# Patient Record
Sex: MALE | Race: OTHER | HISPANIC OR LATINO | Employment: UNEMPLOYED | URBAN - METROPOLITAN AREA
[De-identification: names, ages, dates, MRNs, and addresses within clinical notes are randomized per-mention and may not be internally consistent; named-entity substitution may affect disease eponyms.]

---

## 2017-01-09 ENCOUNTER — ALLSCRIPTS OFFICE VISIT (OUTPATIENT)
Dept: OTHER | Facility: OTHER | Age: 32
End: 2017-01-09

## 2017-11-12 ENCOUNTER — APPOINTMENT (EMERGENCY)
Dept: RADIOLOGY | Facility: HOSPITAL | Age: 32
End: 2017-11-12
Payer: COMMERCIAL

## 2017-11-12 ENCOUNTER — APPOINTMENT (EMERGENCY)
Dept: NON INVASIVE DIAGNOSTICS | Facility: HOSPITAL | Age: 32
End: 2017-11-12
Payer: COMMERCIAL

## 2017-11-12 ENCOUNTER — HOSPITAL ENCOUNTER (EMERGENCY)
Facility: HOSPITAL | Age: 32
Discharge: HOME | End: 2017-11-12
Admitting: EMERGENCY MEDICINE
Payer: COMMERCIAL

## 2017-11-12 VITALS
HEART RATE: 84 BPM | WEIGHT: 191.2 LBS | RESPIRATION RATE: 16 BRPM | SYSTOLIC BLOOD PRESSURE: 133 MMHG | OXYGEN SATURATION: 98 % | TEMPERATURE: 98.5 F | DIASTOLIC BLOOD PRESSURE: 81 MMHG

## 2017-11-12 DIAGNOSIS — M79.89 LEG SWELLING: Primary | ICD-10-CM

## 2017-11-12 LAB
ALBUMIN SERPL BCP-MCNC: 3.6 G/DL (ref 3.5–5)
ALP SERPL-CCNC: 83 U/L (ref 46–116)
ALT SERPL W P-5'-P-CCNC: 50 U/L (ref 12–78)
ANION GAP SERPL CALCULATED.3IONS-SCNC: 2 MMOL/L (ref 4–13)
AST SERPL W P-5'-P-CCNC: 31 U/L (ref 5–45)
BASOPHILS # BLD AUTO: 0.02 THOUSANDS/ΜL (ref 0–0.1)
BASOPHILS NFR BLD AUTO: 0 % (ref 0–1)
BILIRUB SERPL-MCNC: 0.36 MG/DL (ref 0.2–1)
BUN SERPL-MCNC: 13 MG/DL (ref 5–25)
CALCIUM SERPL-MCNC: 8.8 MG/DL (ref 8.3–10.1)
CHLORIDE SERPL-SCNC: 103 MMOL/L (ref 100–108)
CO2 SERPL-SCNC: 35 MMOL/L (ref 21–32)
CREAT SERPL-MCNC: 0.71 MG/DL (ref 0.6–1.3)
EOSINOPHIL # BLD AUTO: 0.18 THOUSAND/ΜL (ref 0–0.61)
EOSINOPHIL NFR BLD AUTO: 2 % (ref 0–6)
ERYTHROCYTE [DISTWIDTH] IN BLOOD BY AUTOMATED COUNT: 13.4 % (ref 11.6–15.1)
GFR SERPL CREATININE-BSD FRML MDRD: 123 ML/MIN/1.73SQ M
GLUCOSE SERPL-MCNC: 100 MG/DL (ref 65–140)
HCT VFR BLD AUTO: 38.8 % (ref 36.5–49.3)
HGB BLD-MCNC: 12.9 G/DL (ref 12–17)
LYMPHOCYTES # BLD AUTO: 2.6 THOUSANDS/ΜL (ref 0.6–4.47)
LYMPHOCYTES NFR BLD AUTO: 26 % (ref 14–44)
MCH RBC QN AUTO: 28.4 PG (ref 26.8–34.3)
MCHC RBC AUTO-ENTMCNC: 33.2 G/DL (ref 31.4–37.4)
MCV RBC AUTO: 86 FL (ref 82–98)
MONOCYTES # BLD AUTO: 0.69 THOUSAND/ΜL (ref 0.17–1.22)
MONOCYTES NFR BLD AUTO: 7 % (ref 4–12)
NEUTROPHILS # BLD AUTO: 6.39 THOUSANDS/ΜL (ref 1.85–7.62)
NEUTS SEG NFR BLD AUTO: 65 % (ref 43–75)
NRBC BLD AUTO-RTO: 0 /100 WBCS
NT-PROBNP SERPL-MCNC: 15 PG/ML
PLATELET # BLD AUTO: 172 THOUSANDS/UL (ref 149–390)
PMV BLD AUTO: 9 FL (ref 8.9–12.7)
POTASSIUM SERPL-SCNC: 4.2 MMOL/L (ref 3.5–5.3)
PROT SERPL-MCNC: 6.9 G/DL (ref 6.4–8.2)
RBC # BLD AUTO: 4.54 MILLION/UL (ref 3.88–5.62)
SODIUM SERPL-SCNC: 140 MMOL/L (ref 136–145)
WBC # BLD AUTO: 9.88 THOUSAND/UL (ref 4.31–10.16)

## 2017-11-12 PROCEDURE — 80053 COMPREHEN METABOLIC PANEL: CPT | Performed by: PHYSICIAN ASSISTANT

## 2017-11-12 PROCEDURE — 99284 EMERGENCY DEPT VISIT MOD MDM: CPT

## 2017-11-12 PROCEDURE — 71020 HB CHEST X-RAY 2VW FRONTAL&LATL: CPT

## 2017-11-12 PROCEDURE — 85025 COMPLETE CBC W/AUTO DIFF WBC: CPT | Performed by: PHYSICIAN ASSISTANT

## 2017-11-12 PROCEDURE — 71010 HB CHEST X-RAY 1 VIEW FRONTAL: CPT

## 2017-11-12 PROCEDURE — 36415 COLL VENOUS BLD VENIPUNCTURE: CPT | Performed by: PHYSICIAN ASSISTANT

## 2017-11-12 PROCEDURE — 93970 EXTREMITY STUDY: CPT

## 2017-11-12 PROCEDURE — 83880 ASSAY OF NATRIURETIC PEPTIDE: CPT | Performed by: PHYSICIAN ASSISTANT

## 2017-11-12 RX ORDER — DIAZEPAM 2 MG/1
2 TABLET ORAL
COMMUNITY
Start: 2017-11-02 | End: 2019-06-10 | Stop reason: ALTCHOICE

## 2017-11-12 RX ORDER — BACLOFEN 10 MG/1
TABLET ORAL
COMMUNITY
Start: 2016-12-05 | End: 2019-06-10 | Stop reason: ALTCHOICE

## 2017-11-12 RX ORDER — HYDROCODONE BITARTRATE AND ACETAMINOPHEN 10; 325 MG/1; MG/1
TABLET ORAL
COMMUNITY
Start: 2016-12-05 | End: 2019-06-10 | Stop reason: ALTCHOICE

## 2017-11-12 RX ORDER — QUETIAPINE FUMARATE 50 MG/1
TABLET, EXTENDED RELEASE ORAL
COMMUNITY
Start: 2016-12-02 | End: 2019-06-10 | Stop reason: ALTCHOICE

## 2017-11-12 RX ORDER — OXYCODONE HYDROCHLORIDE 5 MG/1
5 TABLET ORAL
COMMUNITY
Start: 2017-11-02 | End: 2017-11-12

## 2017-11-12 RX ORDER — NAPROXEN 500 MG/1
500 TABLET ORAL 2 TIMES DAILY WITH MEALS
Qty: 30 TABLET | Refills: 0 | Status: SHIPPED | OUTPATIENT
Start: 2017-11-12 | End: 2019-06-10 | Stop reason: ALTCHOICE

## 2017-11-12 RX ORDER — ZOLPIDEM TARTRATE 10 MG/1
TABLET ORAL
COMMUNITY
Start: 2016-12-05 | End: 2019-06-10 | Stop reason: ALTCHOICE

## 2017-11-12 NOTE — ED NOTES
Isabel Mcwilliams returned to bedside to speak w/patient for second time, per patient request        Royer Nunn RN  11/12/17 9374

## 2017-11-12 NOTE — DISCHARGE INSTRUCTIONS
Edema de la pierna   LO QUE NECESITA SABER:   El edema de la pierna es donaldo inflamación causada por la acumulación de líquido  Las piernas pueden hincharse si usted está sentado o de pie leslie largos períodos de [place], está [place], o está lesionado  La inflamación también se puede presentar si usted tiene insuficiencia cardíaca o problemas de la circulación  [place] significa que christy corazón no bombea todd a christy cuerpo lawrence debería  INSTRUCCIONES SOBRE [facility]:   Cuidados personales:   · Eleve raj piernas:  Levante raj piernas por encima del nivel de christy corazón tan seguido lawrence pueda  [place] va a disminuir inflamación y el dolor  Coloque raj piernas sobre almohadas o cobijas para mantenerlas elevadas cómodamente  · Use medias de compresión:  Estas medias apretadas generan presión en raj piernas para promover la circulación de todd y prevenir un coágulo de [place]  Use las medias leslie el día  No las use al dormir  · Aplique calor:  El calor ayuda a disminuir el dolor y la inflamación  Aplíquese calor en el área lesionada leslie 20 a 30 minutos cada 2 horas leslie la cantidad de [employer] indiquen  · Manténgase activo:  No esté de pie o sentado por mucho tiempo  Pregunte a christy médico acerca del mejor plan de ejercicio para usted  · Consuma alimentos saludables:  Los alimentos saludables incluyen frutas, verduras, pan integral, productos lácteos bajos en grasa, frijoles, ana magras y pescado  Pregunte si necesita seguir donaldo dieta especial  Limite la ryan  La sal hará que christy cuerpo retenga aún más líquidos  Acuda a raj consultas de control con christy médico según le indicaron  Anote raj preguntas para que se acuerde de hacerlas leslie raj visitas  Pregúntele a christy Len Hasmukh vitaminas y minerales son adecuados para usted     · Tiene fiebre o se siente más cansado de lo normal     · Las venas en raj piernas se beatrice más grandes de lo normal  Se podrían notar llenas o estar abultadas  · Tena piernas le pican o se sienten pesadas  · Tiene áreas o llagas pickens o edgar en tena piernas  Smith piel podría parecer con hoyuelos o podría tener hendiduras  · Está subiendo de [place]  · Tiene dificultad para  los tobillos  · La inflamación no desaparece, u otras partes de smith cuerpo se hinchan  · Usted tiene preguntas o inquietudes acerca de smith condición o cuidado  Regrese a la keely de emergencias si:   · No puede caminar  · Se siente desmayar o confundido  · Smith piel se ha puesto gerardo o elsi  · Smith pierna se siente cálida, sensible y [place]  Puede que estén inflamados y rojos  · Tiene dolor de pecho o dificultad para respirar que es peor que cuando se acostó  · De repente se siente mareado y tiene dificultad para respirar  · Le viene repentinamente un nuevo dolor en el pecho  Es probable que sienta más dolor cuando respira profundo o tose  Es posible que también expectore Jenaro copeland  © 2017 [facility] Information is for End User's use only and may not be sold, redistributed or otherwise used for commercial purposes  All illustrations and images included in CareNotes® are the copyrighted property of A D A M , Inc  or Emiliano Bishop  Esta información es sólo para uso en educación  Smith intención no es darle un consejo médico sobre enfermedades o tratamientos  Colsulte con smith [place] Kev farmacéutico antes de seguir cualquier régimen médico para saber si es seguro y efectivo para usted  DISCHARGE INSTRUCTIONS:    FOLLOW UP WITH YOUR PRIMARY CARE PROVIDER OR THE [address] [place] [address]  MAKE AN APPOINTMENT TO BE SEEN  TAKE NAPROXEN AS PRESCRIBED FOR PAIN AND INFLAMMATION  IF RASH, SHORTNESS OF BREATH OR TROUBLE SWALLOWING, STOP TAKING THE MEDICATION AND BE SEEN  REST AND ELEVATE THE LEGS  IF SYMPTOMS WORSEN OR NEW SYMPTOMS ARISE, RETURN TO THE ER TO BE SEEN

## 2017-11-12 NOTE — ED PROVIDER NOTES
History  Chief Complaint   Patient presents with    Leg Swelling     reports left leg swelling and pain x3 days  32y  o male with PMH of carpal tunnel and arthritis presents to the ER for bilateral leg swelling for 1 day  Patient states he has a history of arthritis in all of his joints and normally has pain but pain has been worsening and now he has swelling of his bilateral calf, ankle and feet  He denies taking any medication for his symptoms  He states his pain is located in both legs involving the entire leg  He states his symptoms are constant  He denies fever, chills, chest pain, dyspnea, N/V/D, abdominal pain, weakness or paresthesias  He denies long plane/car rides, hormone therapy, recent surgery, recent pregnancy, history of DVT or cancer  History provided by:  Patient   used: No        Prior to Admission Medications   Prescriptions Last Dose Informant Patient Reported? Taking? HYDROcodone-acetaminophen (NORCO)  mg per tablet   Yes Yes   Sig: Take by mouth   QUEtiapine (SEROquel XR) 50 mg   Yes Yes   Sig: Take by mouth   baclofen 10 mg tablet   Yes Yes   Sig: Take by mouth   diazepam (VALIUM) 2 mg tablet   Yes Yes   Sig: Take 2 mg by mouth   oxyCODONE (ROXICODONE) 5 mg immediate release tablet   Yes No   Sig: Take 5 mg by mouth   zolpidem (AMBIEN) 10 mg tablet   Yes Yes   Sig: Take by mouth      Facility-Administered Medications: None       Past Medical History:   Diagnosis Date    Carpal tunnel syndrome        Past Surgical History:   Procedure Laterality Date    CARPAL TUNNEL RELEASE         History reviewed  No pertinent family history  I have reviewed and agree with the history as documented  Social History   Substance Use Topics    Smoking status: Current Every Day Smoker    Smokeless tobacco: Never Used    Alcohol use No        Review of Systems   Constitutional: Negative for chills and fever  Eyes: Negative for visual disturbance     Respiratory: Negative for shortness of breath  Cardiovascular: Positive for leg swelling  Negative for chest pain  Gastrointestinal: Negative for abdominal pain, diarrhea, nausea and vomiting  Genitourinary: Negative for dysuria, frequency, hematuria and urgency  Musculoskeletal: Negative for back pain, neck pain and neck stiffness  Skin: Negative for rash  Neurological: Negative for weakness and numbness  Physical Exam  ED Triage Vitals [11/12/17 1124]   Temperature Pulse Respirations Blood Pressure SpO2   98 5 °F (36 9 °C) 104 18 136/72 95 %      Temp Source Heart Rate Source Patient Position - Orthostatic VS BP Location FiO2 (%)   Temporal -- Sitting Right arm --      Pain Score       8           Orthostatic Vital Signs  Vitals:    11/12/17 1124 11/12/17 1407 11/12/17 1505   BP: 136/72 132/83 133/81   Pulse: 104 86 84   Patient Position - Orthostatic VS: Sitting         Physical Exam   Constitutional:  Non-toxic appearance  No distress  HENT:   Head: Normocephalic and atraumatic  Right Ear: Tympanic membrane, external ear and ear canal normal  No drainage, swelling or tenderness  No foreign bodies  Tympanic membrane is not erythematous  No hemotympanum  Left Ear: Tympanic membrane, external ear and ear canal normal  No drainage, swelling or tenderness  No foreign bodies  Tympanic membrane is not erythematous  No hemotympanum  Nose: Nose normal    Mouth/Throat: Uvula is midline, oropharynx is clear and moist and mucous membranes are normal  No uvula swelling  No posterior oropharyngeal edema, posterior oropharyngeal erythema or tonsillar abscesses  No tonsillar exudate  Eyes: Conjunctivae and EOM are normal  Pupils are equal, round, and reactive to light  Neck: Normal range of motion and phonation normal  Neck supple  No tracheal deviation present  Cardiovascular: Normal rate, regular rhythm, S1 normal, S2 normal and normal heart sounds  Exam reveals no gallop and no friction rub      No murmur heard   Pulmonary/Chest: Effort normal and breath sounds normal  No respiratory distress  He has no decreased breath sounds  He has no wheezes  He has no rhonchi  He has no rales  He exhibits no tenderness  Abdominal: Soft  Bowel sounds are normal  He exhibits no distension  There is no tenderness  There is no rebound and no guarding  Musculoskeletal: Normal range of motion  He exhibits no edema or deformity  Right knee: He exhibits swelling  He exhibits normal range of motion, no ecchymosis, no deformity and no laceration  Tenderness found  Left knee: He exhibits swelling  He exhibits normal range of motion, no ecchymosis, no deformity, no laceration and no erythema  Tenderness found  Right ankle: He exhibits swelling  He exhibits normal range of motion, no ecchymosis, no deformity, no laceration and normal pulse  Tenderness  Lateral malleolus and medial malleolus tenderness found  Left ankle: He exhibits swelling  He exhibits normal range of motion, no ecchymosis, no deformity, no laceration and normal pulse  Tenderness  Lateral malleolus and medial malleolus tenderness found  Cervical back: Normal         Thoracic back: Normal         Lumbar back: Normal         Right lower leg: He exhibits tenderness and swelling  He exhibits no deformity  Left lower leg: He exhibits tenderness and swelling  He exhibits no deformity  Right foot: There is tenderness and swelling  There is normal range of motion, no crepitus, no deformity and no laceration  Left foot: There is tenderness and swelling  There is normal range of motion, no crepitus, no deformity and no laceration  Neurological: He is alert  Gait normal  GCS eye subscore is 4  GCS verbal subscore is 5  GCS motor subscore is 6  Skin: Skin is warm and dry  No rash noted  Psychiatric: He has a normal mood and affect  Nursing note and vitals reviewed        ED Medications  Medications - No data to display    Diagnostic Studies  Results Reviewed     Procedure Component Value Units Date/Time    BNP [[id number]]  (Normal) Collected:  11/12/17 1407    Lab Status:  Final result Specimen:  Blood from Arm, Left Updated:  11/12/17 1439     NT-proBNP 15 pg/mL     Comprehensive metabolic panel [[id number]]  (Abnormal) Collected:  11/12/17 1407    Lab Status:  Final result Specimen:  Blood from Arm, Left Updated:  11/12/17 1433     Sodium 140 mmol/L      Potassium 4 2 mmol/L      Chloride 103 mmol/L      CO2 35 (H) mmol/L      Anion Gap 2 (L) mmol/L      BUN 13 mg/dL      Creatinine 0 71 mg/dL      Glucose 100 mg/dL      Calcium 8 8 mg/dL      AST 31 U/L      ALT 50 U/L      Alkaline Phosphatase 83 U/L      Total Protein 6 9 g/dL      Albumin 3 6 g/dL      Total Bilirubin 0 36 mg/dL      eGFR 123 ml/min/1 73sq m     Narrative:         National Kidney Disease Education Program recommendations are as follows:  GFR calculation is accurate only with a steady state creatinine  Chronic Kidney disease less than 60 ml/min/1 73 sq  meters  Kidney failure less than 15 ml/min/1 73 sq  meters      CBC and differential [[id number]]  (Normal) Collected:  11/12/17 1407    Lab Status:  Final result Specimen:  Blood from Arm, Left Updated:  11/12/17 1419     WBC 9 88 Thousand/uL      RBC 4 54 Million/uL      Hemoglobin 12 9 g/dL      Hematocrit 38 8 %      MCV 86 fL      MCH 28 4 pg      MCHC 33 2 g/dL      RDW 13 4 %      MPV 9 0 fL      Platelets 429 Thousands/uL      nRBC 0 /100 WBCs      Neutrophils Relative 65 %      Lymphocytes Relative 26 %      Monocytes Relative 7 %      Eosinophils Relative 2 %      Basophils Relative 0 %      Neutrophils Absolute 6 39 Thousands/µL      Lymphocytes Absolute 2 60 Thousands/µL      Monocytes Absolute 0 69 Thousand/µL      Eosinophils Absolute 0 18 Thousand/µL      Basophils Absolute 0 02 Thousands/µL                  XR chest 2 views   ED Interpretation by Sydney Aragon PA-C (11/12 1452)   No acute findings seen at this time by me  Final Result by Roberto Carlos Multani MD (11/12 5897)      Nodular density overlying the right anterior 4th rib  Repeat PA chest x-ray to be performed with nipple markers  Workstation performed: [id number]         XR chest 1 view   Final Result by Roberto Carlos Multani MD (11/12 1456)      No active disease  Workstation performed: [id number]         VAS lower limb venous duplex study, complete bilateral    (Results Pending)              Procedures  Procedures       Phone Contacts  ED Phone Contact    ED Course  ED Course as of Nov 12 2043   Sun Nov 12, 2017   1448 Spoke with ultrasound technician, who informed me that the patient was negative for DVT bilaterally  MDM  Number of Diagnoses or Management Options  Leg swelling: new and requires workup  Diagnosis management comments: DDX consists of but not limited to: nephritis, DVT, arthritis, heart failure    Will check CBC, CMP, BNP, CXR and ultrasound of legs  1 Spoke with ultrasound technician, who informed me that the patient was negative for DVT bilaterally  At discharge, I instructed the patient to:  -follow up with pcp  -take Naproxen as prescribed for pain and inflammation  -rest and elevate your legs  -return to the ER if symptoms worsened or new symptoms arose  Patient agreed to this plan and was stable at time of discharge           Amount and/or Complexity of Data Reviewed  Clinical lab tests: ordered and reviewed  Tests in the radiology section of CPT®: ordered and reviewed  Independent visualization of images, tracings, or specimens: yes    Patient Progress  Patient progress: stable    CritCare Time    Disposition  Final diagnoses:   Leg swelling     Time reflects when diagnosis was documented in both MDM as applicable and the Disposition within this note     Time User Action Codes Description Comment    11/12/2017  2:53 PM Dayanara EDWARDS Add [M87 89] Leg swelling ED Disposition     ED Disposition Condition Comment    Discharge  [address] discharge to home/self care  Condition at discharge: Stable        Follow-up Information     Follow up With Specialties Details Why Contact Malou Bernard MD Family Medicine Schedule an appointment as soon as possible for a visit in 1 day  [address] [facility] [place]  [address]   [phone]  [phone]  [phone]          Discharge Medication List as of 11/12/2017  2:54 PM      START taking these medications    Details   naproxen (NAPROSYN) 500 mg tablet Take 1 tablet by mouth 2 (two) times a day with meals, Starting Sun 11/12/2017, Print         CONTINUE these medications which have NOT CHANGED    Details   baclofen 10 mg tablet Take by mouth, Starting Mon 12/5/2016, Historical Med      diazepam (VALIUM) 2 mg tablet Take 2 mg by mouth, Starting Thu 11/2/2017, Until Sun 11/12/2017, Historical Med      HYDROcodone-acetaminophen ([facility])  mg per tablet Take by mouth, Starting Mon 12/5/2016, Historical Med      QUEtiapine (SEROquel XR) 50 mg Take by mouth, Starting Fri 12/2/2016, Historical Med      zolpidem (AMBIEN) 10 mg tablet Take by mouth, Starting Mon 12/5/2016, Historical Med      oxyCODONE (ROXICODONE) 5 mg immediate release tablet Take 5 mg by mouth, Starting Thu 11/2/2017, Until Sun 11/12/2017, Historical Med           No discharge procedures on file      ED Provider  Electronically Signed by           Jayshree Mcgraw PA-C  11/12/17 6203

## 2017-11-12 NOTE — ED NOTES
Informed patient that vascular will arrive in aprox 45 minutes    Patient states that is ok and that he will wait for their arrival      Benson Crowell RN  11/12/17 5772

## 2018-01-14 VITALS
HEIGHT: 66 IN | RESPIRATION RATE: 20 BRPM | HEART RATE: 88 BPM | BODY MASS INDEX: 28.77 KG/M2 | TEMPERATURE: 98.5 F | WEIGHT: 179 LBS | DIASTOLIC BLOOD PRESSURE: 80 MMHG | SYSTOLIC BLOOD PRESSURE: 120 MMHG | OXYGEN SATURATION: 96 %

## 2019-02-09 ENCOUNTER — HOSPITAL ENCOUNTER (EMERGENCY)
Facility: HOSPITAL | Age: 34
Discharge: HOME | End: 2019-02-09
Admitting: EMERGENCY MEDICINE
Payer: COMMERCIAL

## 2019-02-09 ENCOUNTER — APPOINTMENT (EMERGENCY)
Dept: RADIOLOGY | Facility: HOSPITAL | Age: 34
End: 2019-02-09
Payer: COMMERCIAL

## 2019-02-09 ENCOUNTER — APPOINTMENT (EMERGENCY)
Dept: NON INVASIVE DIAGNOSTICS | Facility: HOSPITAL | Age: 34
End: 2019-02-09
Payer: COMMERCIAL

## 2019-02-09 VITALS
BODY MASS INDEX: 32.74 KG/M2 | DIASTOLIC BLOOD PRESSURE: 71 MMHG | RESPIRATION RATE: 20 BRPM | HEART RATE: 63 BPM | SYSTOLIC BLOOD PRESSURE: 115 MMHG | TEMPERATURE: 97.2 F | OXYGEN SATURATION: 99 % | WEIGHT: 202.82 LBS

## 2019-02-09 DIAGNOSIS — R60.0 LOWER EXTREMITY EDEMA: ICD-10-CM

## 2019-02-09 DIAGNOSIS — M54.50 ACUTE EXACERBATION OF CHRONIC LOW BACK PAIN: Primary | ICD-10-CM

## 2019-02-09 DIAGNOSIS — G89.29 ACUTE EXACERBATION OF CHRONIC LOW BACK PAIN: Primary | ICD-10-CM

## 2019-02-09 LAB
ALBUMIN SERPL BCP-MCNC: 3.4 G/DL (ref 3.5–5)
ALP SERPL-CCNC: 90 U/L (ref 46–116)
ALT SERPL W P-5'-P-CCNC: 52 U/L (ref 12–78)
ANION GAP SERPL CALCULATED.3IONS-SCNC: 6 MMOL/L (ref 4–13)
AST SERPL W P-5'-P-CCNC: 27 U/L (ref 5–45)
BASOPHILS # BLD AUTO: 0.04 THOUSANDS/ΜL (ref 0–0.1)
BASOPHILS NFR BLD AUTO: 1 % (ref 0–1)
BILIRUB SERPL-MCNC: 0.19 MG/DL (ref 0.2–1)
BUN SERPL-MCNC: 13 MG/DL (ref 5–25)
CALCIUM SERPL-MCNC: 8.5 MG/DL (ref 8.3–10.1)
CHLORIDE SERPL-SCNC: 105 MMOL/L (ref 100–108)
CO2 SERPL-SCNC: 29 MMOL/L (ref 21–32)
CREAT SERPL-MCNC: 0.82 MG/DL (ref 0.6–1.3)
DEPRECATED D DIMER PPP: 598 NG/ML (FEU)
EOSINOPHIL # BLD AUTO: 0.25 THOUSAND/ΜL (ref 0–0.61)
EOSINOPHIL NFR BLD AUTO: 3 % (ref 0–6)
ERYTHROCYTE [DISTWIDTH] IN BLOOD BY AUTOMATED COUNT: 13.2 % (ref 11.6–15.1)
GFR SERPL CREATININE-BSD FRML MDRD: 115 ML/MIN/1.73SQ M
GLUCOSE SERPL-MCNC: 131 MG/DL (ref 65–140)
HCT VFR BLD AUTO: 39.9 % (ref 36.5–49.3)
HGB BLD-MCNC: 13 G/DL (ref 12–17)
IMM GRANULOCYTES # BLD AUTO: 0.02 THOUSAND/UL (ref 0–0.2)
IMM GRANULOCYTES NFR BLD AUTO: 0 % (ref 0–2)
LYMPHOCYTES # BLD AUTO: 3.21 THOUSANDS/ΜL (ref 0.6–4.47)
LYMPHOCYTES NFR BLD AUTO: 41 % (ref 14–44)
MCH RBC QN AUTO: 28.8 PG (ref 26.8–34.3)
MCHC RBC AUTO-ENTMCNC: 32.6 G/DL (ref 31.4–37.4)
MCV RBC AUTO: 88 FL (ref 82–98)
MONOCYTES # BLD AUTO: 0.57 THOUSAND/ΜL (ref 0.17–1.22)
MONOCYTES NFR BLD AUTO: 7 % (ref 4–12)
NEUTROPHILS # BLD AUTO: 3.71 THOUSANDS/ΜL (ref 1.85–7.62)
NEUTS SEG NFR BLD AUTO: 48 % (ref 43–75)
NRBC BLD AUTO-RTO: 0 /100 WBCS
NT-PROBNP SERPL-MCNC: 9 PG/ML
PLATELET # BLD AUTO: 192 THOUSANDS/UL (ref 149–390)
PMV BLD AUTO: 8.7 FL (ref 8.9–12.7)
POTASSIUM SERPL-SCNC: 3.5 MMOL/L (ref 3.5–5.3)
PROT SERPL-MCNC: 6.8 G/DL (ref 6.4–8.2)
RBC # BLD AUTO: 4.52 MILLION/UL (ref 3.88–5.62)
SODIUM SERPL-SCNC: 140 MMOL/L (ref 136–145)
WBC # BLD AUTO: 7.8 THOUSAND/UL (ref 4.31–10.16)

## 2019-02-09 PROCEDURE — 72100 X-RAY EXAM L-S SPINE 2/3 VWS: CPT

## 2019-02-09 PROCEDURE — 83880 ASSAY OF NATRIURETIC PEPTIDE: CPT | Performed by: PHYSICIAN ASSISTANT

## 2019-02-09 PROCEDURE — 36415 COLL VENOUS BLD VENIPUNCTURE: CPT | Performed by: PHYSICIAN ASSISTANT

## 2019-02-09 PROCEDURE — 85025 COMPLETE CBC W/AUTO DIFF WBC: CPT | Performed by: PHYSICIAN ASSISTANT

## 2019-02-09 PROCEDURE — 80053 COMPREHEN METABOLIC PANEL: CPT | Performed by: PHYSICIAN ASSISTANT

## 2019-02-09 PROCEDURE — 85379 FIBRIN DEGRADATION QUANT: CPT | Performed by: PHYSICIAN ASSISTANT

## 2019-02-09 PROCEDURE — 99284 EMERGENCY DEPT VISIT MOD MDM: CPT

## 2019-02-09 PROCEDURE — 93970 EXTREMITY STUDY: CPT

## 2019-02-09 RX ORDER — ACETAMINOPHEN 325 MG/1
975 TABLET ORAL ONCE
Status: COMPLETED | OUTPATIENT
Start: 2019-02-09 | End: 2019-02-09

## 2019-02-09 RX ORDER — LIDOCAINE 50 MG/G
1 PATCH TOPICAL DAILY
Qty: 30 PATCH | Refills: 0 | Status: SHIPPED | OUTPATIENT
Start: 2019-02-09 | End: 2019-06-10 | Stop reason: ALTCHOICE

## 2019-02-09 RX ORDER — IBUPROFEN 400 MG/1
800 TABLET ORAL ONCE
Status: COMPLETED | OUTPATIENT
Start: 2019-02-09 | End: 2019-02-09

## 2019-02-09 RX ORDER — ACETAMINOPHEN 500 MG
500 TABLET ORAL EVERY 6 HOURS PRN
Qty: 30 TABLET | Refills: 0 | Status: SHIPPED | OUTPATIENT
Start: 2019-02-09

## 2019-02-09 RX ORDER — METHOCARBAMOL 750 MG/1
750 TABLET, FILM COATED ORAL EVERY 6 HOURS PRN
Qty: 30 TABLET | Refills: 0 | Status: SHIPPED | OUTPATIENT
Start: 2019-02-09 | End: 2019-06-10 | Stop reason: ALTCHOICE

## 2019-02-09 RX ORDER — IBUPROFEN 400 MG/1
400 TABLET ORAL EVERY 6 HOURS PRN
Qty: 30 TABLET | Refills: 0 | Status: SHIPPED | OUTPATIENT
Start: 2019-02-09

## 2019-02-09 RX ADMIN — IBUPROFEN 800 MG: 400 TABLET ORAL at 20:19

## 2019-02-09 RX ADMIN — ACETAMINOPHEN 975 MG: 325 TABLET, FILM COATED ORAL at 20:19

## 2019-02-10 PROCEDURE — 93970 EXTREMITY STUDY: CPT | Performed by: SURGERY
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
## 2019-02-10 NOTE — ED NOTES
Patient returned from  Martha Sanabria RN  02/09/19 1950
Patient transported to [address], RN  02/09/19 8708
Patient transported to [email or web address]  02/09/19 1043
Provider at bedside       Lonnie Gannon RN  02/09/19 [facility]
U/S at bedside        Juan Daniel Coronado RN  02/09/19 2017
Vascular paged        Cat Barker, FAWAD  02/09/19 8353
synthroid

## 2019-02-10 NOTE — DISCHARGE INSTRUCTIONS
Dolor michael de espalda inferior   CUIDADO AMBULATORIO:   El dolor michael de la región lumbar de la espalda  es donaldo molestia repentina en la parte inferior de christy espalda que dura hasta por 6 semanas  La molestia hace que sea dificil que usted tolere la [place]  Los síntomas más comunes incluyen los siguientes:   · Rigidez o espasmos    · Dolor hacia abajo o en un lado de christy pierna    · Mantenerse en donaldo posición o postura inusual para disminuir el dolor en christy espalda    · No poder encontrar donaldo posición cómoda mientras está sentado    · Poco a poco aumento del dolor por 24 o 50 horas después de ejercer tensión en christy espalda    · [employer] en el lumbago o dolor intenso cuando mueve christy espalda  Busque atención médica inmediata para los siguientes síntomas:   · Dolor intenso    · Repentinamente rigidez y pesadez en ambos glúteos hacia abajo de ambas piernas    · Entumecimiento o debilidad en donaldo pierna o dolor en ambas piernas    · Entumecimiento en el área genital o a través de la parte baja de christy espalda    · Incapaz de controlar la orina o raj evacuaciones intestinales  Pregúntele a christy médico qué vitaminas y minerales son adecuados para usted  · Usted tiene fiebre  · Usted tiene un dolor por la noche o cuando descansa  · Christy dolor no mejora con el tratamiento  · Usted tiene dolor que empeora cuando tose o estornuda  · Usted siente un estallido o chasquido repentino en christy espalda  · Usted tiene preguntas o inquietudes acerca de christy condición o cuidado  La meta del tratamiento para el dolor de la parte inferior de la espalda  es aliviar christy dolor y ayudarlo a tolerar la actividad  La mayoría de las personas que tienen dolor michael de la parte inferior de la espalda se mejoran entre unas 4 a 6 semanas  Es posible que usted necesite alguno de los siguientes:  · El acetaminofén  dionne el dolor  Está disponible sin receta médica  Pregunte la cantidad y la frecuencia con que debe tomarlos   [address] indicaciones  El acetaminofén puede causar daño en el hígado cuando no se drew de forma correcta  · AINEs (Analgésicos antiinflamatorios no esteroides)  ayudan a disminuir la inflamación y el dolor  Marsha medicamento esta disponible con o sin donaldo receta médica  Los AINEs pueden causar sangrado estomacal o problemas renales en ciertas personas  Si usted drew un medicamento anticoagulante, siempre pregúntele a christy médico si los BERTRAND son seguros para usted  Siempre elen la etiqueta de marsha medicamento y [place] instrucciones  · Un medicamento con receta para el dolor  podrían ser Florentino Fret  Pregunte al médico cómo debe jaylan marsha medicamento de forma riley  · Relajantes musculares  disminuyen el dolor y [employer] músculos de la parte inferior de la columna  El [place] de christy síntomas:   · Manténgase activo  lo más que pueda sin causar más dolor  El reposo en cama puede empeorar christy dolor de espalda  Comience con ejercicios ligeros lawrence caminar  Evite levantar objetos hasta que ya no tenga dolor  Solicite más información acerca de las actividades físicas o plan de ejercicios que son los adecuados para usted  · El hielo  ayuda a disminuir la inflamación, el dolor y los espasmos musculares  Ponga hielo cristina en donaldo bolsa plástica  Cúbrala con donaldo toalla  Aplíquela en christy luís lumbar por 20 a 30 minutos cada 2 horas  Ash esto por 2 a 3 días después que el dolor empiece, o según lo indicado  · El calor  ayuda a disminuir dolor y espasmos musculares  Empiece a utilizar calor después de buck terminado el tratamiento con el hielo  Utilice donaldo toalla pequeña empapada con [employer] [place], donaldo almohada térmica o tome un baño de carmela con agua tibia  Aplíquese calor en el área lesionada leslie 20 a 30 minutos cada 2 horas leslie la cantidad de [employer] indiquen  [place] entre el calor y el hielo  Prevenir el dolor michael de la parte inferior de la espalda:   · Use la mecánica corporal adecuada  ¨ Flexione la cadera y las rodillas cuando Mohsen Hones a levantar un objeto  No doble la cintura  Utilice los [employer] de las piernas mientras levanta smith carga  No use smith espalda  Mantenga el objeto cerca de smith pecho mientras lo levanta  No se tuerza, ni levante cualquier cosa por encima de smith cintura  ¨ Cambie smith posición frecuentemente cuando pase mucho tiempo de pie  Descanse un pie sobre donaldo Tracy Drilling o un reposapiés e intercambie con el otro pie frecuentemente  ¨ No permanezca sentado por lapsos de tiempo prolongados  Cuando sea necesario hacerlo, siéntese en donaldo silla de respaldo recto con los pies apoyados en el suelo  Nunca alcance, jale ni empuje mientras se encuentra sentando  · Ash ejercicios que fortalezcan raj músculos de la espalda  Entre en calor antes de hacer ejercicio  Consulte con smith médico sobre [employer] plan de ejercicios para usted  · Mantenga un peso saludable  Consulte con smith médico cuánto debería pesar  Pida que le ayude a crear un plan para bajar de peso si usted tiene sobrepeso  Acuda a raj consultas de control con smith médico según le indicaron  Regrese a donaldo paige de seguimiento si usted aun tiene [employer] de 1 a 3 semanas de [place]  Puede que usted necesite acudir con un ortopedista si smith dolor de espalda dura más de 12 semanas  Anote raj preguntas para que se acuerde de hacerlas leslie raj visitas  © 2017 [address] Information is for End User's use only and may not be sold, redistributed or otherwise used for commercial purposes  All illustrations and images included in CareNotes® are the copyrighted property of A D A M , Inc  or Emiliano Bishop  Esta información es sólo para uso en educación  Smith intención no es darle un consejo médico sobre enfermedades o tratamientos  Colsulte con smith Ladena Creed farmacéutico antes de seguir cualquier régimen médico para saber si es seguro y efectivo para usted

## 2019-02-11 ENCOUNTER — TELEPHONE (OUTPATIENT)
Dept: PHYSICAL THERAPY | Facility: OTHER | Age: 34
End: 2019-02-11

## 2019-02-11 NOTE — TELEPHONE ENCOUNTER
Called patient using [employer]  [id number]  Unsuccessful attempt at reaching patient  Patient did not have a voicemail box set up and  was unable to leave voicemail message at this time

## 2019-02-14 ENCOUNTER — TELEPHONE (OUTPATIENT)
Dept: PHYSICAL THERAPY | Facility: OTHER | Age: 34
End: 2019-02-14

## 2019-02-14 NOTE — TELEPHONE ENCOUNTER
Multiple unsuccessful attempts at reaching patient over multiple days  On this call attempt an automated recording stated, "the person you are trying to reach is unable to accept calls at this time " RN unable to contact patient, leave voicemail message or callback number  Referral to be closed at this time

## 2019-06-10 ENCOUNTER — HOSPITAL ENCOUNTER (EMERGENCY)
Facility: HOSPITAL | Age: 34
Discharge: HOME | End: 2019-06-10
Attending: EMERGENCY MEDICINE | Admitting: EMERGENCY MEDICINE
Payer: COMMERCIAL

## 2019-06-10 ENCOUNTER — APPOINTMENT (EMERGENCY)
Dept: RADIOLOGY | Facility: HOSPITAL | Age: 34
End: 2019-06-10
Payer: COMMERCIAL

## 2019-06-10 VITALS
WEIGHT: 200.4 LBS | BODY MASS INDEX: 32.35 KG/M2 | RESPIRATION RATE: 18 BRPM | SYSTOLIC BLOOD PRESSURE: 150 MMHG | TEMPERATURE: 98.4 F | OXYGEN SATURATION: 97 % | DIASTOLIC BLOOD PRESSURE: 88 MMHG | HEART RATE: 83 BPM

## 2019-06-10 DIAGNOSIS — M25.532 LEFT WRIST PAIN: Primary | ICD-10-CM

## 2019-06-10 PROCEDURE — 99283 EMERGENCY DEPT VISIT LOW MDM: CPT

## 2019-06-10 PROCEDURE — 73110 X-RAY EXAM OF WRIST: CPT

## 2019-06-10 PROCEDURE — 99283 EMERGENCY DEPT VISIT LOW MDM: CPT | Performed by: EMERGENCY MEDICINE

## 2019-06-10 RX ORDER — NAPROXEN 500 MG/1
500 TABLET ORAL 2 TIMES DAILY WITH MEALS
Qty: 10 TABLET | Refills: 0 | Status: SHIPPED | OUTPATIENT
Start: 2019-06-10 | End: 2019-12-18 | Stop reason: ALTCHOICE

## 2019-06-12 ENCOUNTER — OFFICE VISIT (OUTPATIENT)
Dept: OBGYN CLINIC | Facility: MEDICAL CENTER | Age: 34
End: 2019-06-12
Payer: COMMERCIAL

## 2019-06-12 VITALS — WEIGHT: 205 LBS | HEIGHT: 62 IN | BODY MASS INDEX: 37.73 KG/M2

## 2019-06-12 DIAGNOSIS — S63.502A SPRAIN OF LEFT WRIST, INITIAL ENCOUNTER: Primary | ICD-10-CM

## 2019-06-12 PROCEDURE — 99203 OFFICE O/P NEW LOW 30 MIN: CPT | Performed by: ORTHOPAEDIC SURGERY

## 2019-12-18 ENCOUNTER — APPOINTMENT (EMERGENCY)
Dept: RADIOLOGY | Facility: HOSPITAL | Age: 34
End: 2019-12-18
Payer: COMMERCIAL

## 2019-12-18 ENCOUNTER — HOSPITAL ENCOUNTER (EMERGENCY)
Facility: HOSPITAL | Age: 34
Discharge: HOME | End: 2019-12-18
Attending: EMERGENCY MEDICINE
Payer: COMMERCIAL

## 2019-12-18 VITALS
HEART RATE: 84 BPM | TEMPERATURE: 97.7 F | WEIGHT: 212.08 LBS | RESPIRATION RATE: 18 BRPM | BODY MASS INDEX: 38.79 KG/M2 | DIASTOLIC BLOOD PRESSURE: 72 MMHG | SYSTOLIC BLOOD PRESSURE: 137 MMHG | OXYGEN SATURATION: 100 %

## 2019-12-18 DIAGNOSIS — R60.0 BILATERAL LOWER EXTREMITY EDEMA: ICD-10-CM

## 2019-12-18 DIAGNOSIS — M54.50 CHRONIC LOW BACK PAIN: Primary | ICD-10-CM

## 2019-12-18 DIAGNOSIS — G89.29 CHRONIC LOW BACK PAIN: Primary | ICD-10-CM

## 2019-12-18 LAB
ALBUMIN SERPL BCP-MCNC: 3.5 G/DL (ref 3.5–5)
ALP SERPL-CCNC: 92 U/L (ref 46–116)
ALT SERPL W P-5'-P-CCNC: 77 U/L (ref 12–78)
ANION GAP SERPL CALCULATED.3IONS-SCNC: 8 MMOL/L (ref 4–13)
AST SERPL W P-5'-P-CCNC: 38 U/L (ref 5–45)
BASOPHILS # BLD AUTO: 0.04 THOUSANDS/ΜL (ref 0–0.1)
BASOPHILS NFR BLD AUTO: 0 % (ref 0–1)
BILIRUB SERPL-MCNC: 0.23 MG/DL (ref 0.2–1)
BUN SERPL-MCNC: 14 MG/DL (ref 5–25)
CALCIUM SERPL-MCNC: 8.4 MG/DL (ref 8.3–10.1)
CHLORIDE SERPL-SCNC: 102 MMOL/L (ref 100–108)
CO2 SERPL-SCNC: 29 MMOL/L (ref 21–32)
CREAT SERPL-MCNC: 0.89 MG/DL (ref 0.6–1.3)
EOSINOPHIL # BLD AUTO: 0.24 THOUSAND/ΜL (ref 0–0.61)
EOSINOPHIL NFR BLD AUTO: 3 % (ref 0–6)
ERYTHROCYTE [DISTWIDTH] IN BLOOD BY AUTOMATED COUNT: 13.2 % (ref 11.6–15.1)
GFR SERPL CREATININE-BSD FRML MDRD: 111 ML/MIN/1.73SQ M
GLUCOSE SERPL-MCNC: 126 MG/DL (ref 65–140)
HCT VFR BLD AUTO: 40.1 % (ref 36.5–49.3)
HGB BLD-MCNC: 12.8 G/DL (ref 12–17)
IMM GRANULOCYTES # BLD AUTO: 0.02 THOUSAND/UL (ref 0–0.2)
IMM GRANULOCYTES NFR BLD AUTO: 0 % (ref 0–2)
LYMPHOCYTES # BLD AUTO: 3.52 THOUSANDS/ΜL (ref 0.6–4.47)
LYMPHOCYTES NFR BLD AUTO: 37 % (ref 14–44)
MCH RBC QN AUTO: 27.9 PG (ref 26.8–34.3)
MCHC RBC AUTO-ENTMCNC: 31.9 G/DL (ref 31.4–37.4)
MCV RBC AUTO: 87 FL (ref 82–98)
MONOCYTES # BLD AUTO: 0.69 THOUSAND/ΜL (ref 0.17–1.22)
MONOCYTES NFR BLD AUTO: 7 % (ref 4–12)
NEUTROPHILS # BLD AUTO: 4.95 THOUSANDS/ΜL (ref 1.85–7.62)
NEUTS SEG NFR BLD AUTO: 53 % (ref 43–75)
NRBC BLD AUTO-RTO: 0 /100 WBCS
PLATELET # BLD AUTO: 219 THOUSANDS/UL (ref 149–390)
PMV BLD AUTO: 9 FL (ref 8.9–12.7)
POTASSIUM SERPL-SCNC: 3.7 MMOL/L (ref 3.5–5.3)
PROT SERPL-MCNC: 7.2 G/DL (ref 6.4–8.2)
RBC # BLD AUTO: 4.59 MILLION/UL (ref 3.88–5.62)
SODIUM SERPL-SCNC: 139 MMOL/L (ref 136–145)
TSH SERPL DL<=0.05 MIU/L-ACNC: 2.42 UIU/ML (ref 0.36–3.74)
WBC # BLD AUTO: 9.46 THOUSAND/UL (ref 4.31–10.16)

## 2019-12-18 PROCEDURE — 80053 COMPREHEN METABOLIC PANEL: CPT | Performed by: EMERGENCY MEDICINE

## 2019-12-18 PROCEDURE — 84443 ASSAY THYROID STIM HORMONE: CPT | Performed by: EMERGENCY MEDICINE

## 2019-12-18 PROCEDURE — 72100 X-RAY EXAM L-S SPINE 2/3 VWS: CPT

## 2019-12-18 PROCEDURE — 99284 EMERGENCY DEPT VISIT MOD MDM: CPT | Performed by: EMERGENCY MEDICINE

## 2019-12-18 PROCEDURE — 36415 COLL VENOUS BLD VENIPUNCTURE: CPT | Performed by: EMERGENCY MEDICINE

## 2019-12-18 PROCEDURE — 99284 EMERGENCY DEPT VISIT MOD MDM: CPT

## 2019-12-18 PROCEDURE — 90471 IMMUNIZATION ADMIN: CPT

## 2019-12-18 PROCEDURE — 90715 TDAP VACCINE 7 YRS/> IM: CPT | Performed by: EMERGENCY MEDICINE

## 2019-12-18 PROCEDURE — 96374 THER/PROPH/DIAG INJ IV PUSH: CPT

## 2019-12-18 PROCEDURE — 85025 COMPLETE CBC W/AUTO DIFF WBC: CPT | Performed by: EMERGENCY MEDICINE

## 2019-12-18 PROCEDURE — 73590 X-RAY EXAM OF LOWER LEG: CPT

## 2019-12-18 RX ORDER — KETOROLAC TROMETHAMINE 30 MG/ML
15 INJECTION, SOLUTION INTRAMUSCULAR; INTRAVENOUS ONCE
Status: COMPLETED | OUTPATIENT
Start: 2019-12-18 | End: 2019-12-18

## 2019-12-18 RX ORDER — LIDOCAINE 50 MG/G
1 PATCH TOPICAL ONCE
Status: DISCONTINUED | OUTPATIENT
Start: 2019-12-18 | End: 2019-12-18 | Stop reason: HOSPADM

## 2019-12-18 RX ORDER — ACETAMINOPHEN 325 MG/1
975 TABLET ORAL ONCE
Status: COMPLETED | OUTPATIENT
Start: 2019-12-18 | End: 2019-12-18

## 2019-12-18 RX ADMIN — KETOROLAC TROMETHAMINE 15 MG: 30 INJECTION, SOLUTION INTRAMUSCULAR; INTRAVENOUS at 20:30

## 2019-12-18 RX ADMIN — TETANUS TOXOID, REDUCED DIPHTHERIA TOXOID AND ACELLULAR PERTUSSIS VACCINE, ADSORBED 0.5 ML: 5; 2.5; 8; 8; 2.5 SUSPENSION INTRAMUSCULAR at 20:32

## 2019-12-18 RX ADMIN — LIDOCAINE 1 PATCH: 50 PATCH TOPICAL at 20:28

## 2019-12-18 RX ADMIN — ACETAMINOPHEN 975 MG: 325 TABLET ORAL at 20:27

## 2019-12-19 ENCOUNTER — TELEPHONE (OUTPATIENT)
Dept: PHYSICAL THERAPY | Facility: OTHER | Age: 34
End: 2019-12-19

## 2019-12-19 NOTE — ED PROVIDER NOTES
History  Chief Complaint   Patient presents with    Leg Pain     Reports wound to L leg x months, denies drainage, redness, states B/L leg swelling ongoing     Back Pain     Chronic back pain, to B/L legs, dneies new injury  50-year-old male presenting for evaluation of chronic low back pain and bilateral lower extremity swelling  Symptoms have been present for at least the past year  States that he has midline low back pain with radiation to both sides and down both legs to his feet  He has been able ambulate without difficulty  No weakness  States that he does have some pins and needle sensation in bilateral lower extremities but no actual numbness  No trauma to his back  No bladder or bowel retention/incontinence, saddle anesthesia  Patient also reports bilateral lower extremity swelling that is slightly worsening than baseline  Patient was seen in the ED in February regarding the symptoms and had workup and was instructed to establish care with PCP in comprehensive spine program which she has not  He is not taking any current medications for the symptoms  He states that he is in a drug rehab program for pills and heroin, but denies ever using any IV drugs  Denies any fevers, chills, CP, SOB, abdominal pain, nausea vomiting, changes in stool, urinary complaints  A/P:  50-year-old male with chronic low back pain and lower extremity swelling, given patient is stating that he is having worsening edema will work this up by checking kidney function, liver function, electrolytes, refer to PCP and comprehensive spine program           Per review of records, patient seen in the ED in February 2019 for chronic low back pain and bilateral lower extremity swelling    Prior to Admission Medications   Prescriptions Last Dose Informant Patient Reported?  Taking?   acetaminophen (TYLENOL) 500 mg tablet Not Taking at Unknown time  No No   Sig: Take 1 tablet (500 mg total) by mouth every 6 (six) hours as needed for mild pain, moderate pain, headaches or fever   Patient not taking: Reported on 12/18/2019   ibuprofen (MOTRIN) 400 mg tablet Not Taking at Unknown time  No No   Sig: Take 1 tablet (400 mg total) by mouth every 6 (six) hours as needed for mild pain, moderate pain or fever   Patient not taking: Reported on 12/18/2019      Facility-Administered Medications: None       Past Medical History:   Diagnosis Date    Arthritis     Carpal tunnel syndrome     Chronic back pain        Past Surgical History:   Procedure Laterality Date    CARPAL TUNNEL RELEASE         History reviewed  No pertinent family history  I have reviewed and agree with the history as documented  Social History     Tobacco Use    Smoking status: Current Every Day Smoker     Packs/day: 0 50     Types: Cigarettes    Smokeless tobacco: Never Used   Substance Use Topics    Alcohol use: No    Drug use: No        Review of Systems   Constitutional: Negative for chills, fever and unexpected weight change  HENT: Negative for ear pain, rhinorrhea and sore throat  Eyes: Negative for pain and visual disturbance  Respiratory: Negative for cough and shortness of breath  Cardiovascular: Negative for chest pain and leg swelling  Gastrointestinal: Negative for abdominal pain, constipation, diarrhea, nausea and vomiting  Endocrine: Negative for polydipsia, polyphagia and polyuria  Genitourinary: Negative for dysuria, frequency, hematuria and urgency  Musculoskeletal: Positive for back pain  Negative for myalgias and neck pain  Skin: Negative for color change and rash  Allergic/Immunologic: Negative for environmental allergies and immunocompromised state  Neurological: Negative for dizziness, weakness, light-headedness, numbness and headaches  Hematological: Negative for adenopathy  Does not bruise/bleed easily  Psychiatric/Behavioral: Negative for agitation and confusion     All other systems reviewed and are negative  Physical Exam  Physical Exam   Constitutional: He is oriented to person, place, and time  He appears well-developed and well-nourished  HENT:   Head: Normocephalic and atraumatic  Nose: Nose normal    Mouth/Throat: Oropharynx is clear and moist    Eyes: Conjunctivae and EOM are normal    Neck: Normal range of motion  Neck supple  Cardiovascular: Normal rate, regular rhythm, normal heart sounds and intact distal pulses  Pulmonary/Chest: Effort normal and breath sounds normal  No stridor  No respiratory distress  He has no wheezes  He has no rales  He exhibits no tenderness  Abdominal: Soft  He exhibits no distension  There is no tenderness  There is no rebound and no guarding  Back:  No midline C/T spine tenderness, mild tenderness to palpation over midline of lumbar spine, no step-offs or deformities, no external signs of trauma, no overlying skin changes, no swelling, negative straight leg raise test bilaterally, strength 5/5 throughout, sensation is grossly intact, normal reflexes, steady/normal gait   Musculoskeletal: Normal range of motion  He exhibits no deformity  Mild, 1+ edema bilateral lower extremities, no calf swelling/tenderness, scab to left shin without any surrounding skin changes/drainage/evidence of infection   Neurological: He is alert and oriented to person, place, and time  He exhibits normal muscle tone  Coordination normal    Skin: Skin is warm and dry  No rash noted  Psychiatric: He has a normal mood and affect  Judgment and thought content normal    Nursing note and vitals reviewed        Vital Signs  ED Triage Vitals [12/18/19 1923]   Temperature Pulse Respirations Blood Pressure SpO2   97 7 °F (36 5 °C) 98 18 128/76 99 %      Temp Source Heart Rate Source Patient Position - Orthostatic VS BP Location FiO2 (%)   Oral Monitor Sitting Left arm --      Pain Score       Worst Possible Pain           Vitals:    12/18/19 1923 12/18/19 2151   BP: 128/76 137/72   Pulse: 98 84   Patient Position - Orthostatic VS: Sitting Lying         Visual Acuity  Visual Acuity      Most Recent Value   L Pupil Size (mm)  4   R Pupil Size (mm)  4          ED Medications  Medications   ketorolac (TORADOL) injection 15 mg (15 mg Intravenous Given 12/18/19 2030)   acetaminophen (TYLENOL) tablet 975 mg (975 mg Oral Given 12/18/19 2027)   tetanus-diphtheria-acellular pertussis (BOOSTRIX) IM injection 0 5 mL (0 5 mL Intramuscular Given 12/18/19 2032)       Diagnostic Studies  Results Reviewed     Procedure Component Value Units Date/Time    TSH, 3rd generation with Free T4 reflex [[id number]]  (Normal) Collected:  12/18/19 2026    Lab Status:  Final result Specimen:  Blood from Arm, Right Updated:  12/18/19 2102     TSH 3RD GENERATON 2 417 uIU/mL     Narrative:       Patients undergoing fluorescein dye angiography may retain small amounts of fluorescein in the body for 48-72 hours post procedure  Samples containing fluorescein can produce falsely depressed TSH values  If the patient had this procedure,a specimen should be resubmitted post fluorescein clearance        Comprehensive metabolic panel [[id number]] Collected:  12/18/19 2026    Lab Status:  Final result Specimen:  Blood from Arm, Right Updated:  12/18/19 2052     Sodium 139 mmol/L      Potassium 3 7 mmol/L      Chloride 102 mmol/L      CO2 29 mmol/L      ANION GAP 8 mmol/L      BUN 14 mg/dL      Creatinine 0 89 mg/dL      Glucose 126 mg/dL      Calcium 8 4 mg/dL      AST 38 U/L      ALT 77 U/L      Alkaline Phosphatase 92 U/L      Total Protein 7 2 g/dL      Albumin 3 5 g/dL      Total Bilirubin 0 23 mg/dL      eGFR 111 ml/min/1 73sq m     Narrative:       [place] guidelines for Chronic Kidney Disease (CKD):     Stage 1 with normal or high GFR (GFR > 90 mL/min/1 73 square meters)    Stage 2 Mild CKD (GFR = 60-89 mL/min/1 73 square meters)    Stage 3A Moderate CKD (GFR = 45-59 mL/min/1 73 square meters)    Stage 3B Moderate CKD (GFR = 30-44 mL/min/1 73 square meters)    Stage 4 Severe CKD (GFR = 15-29 mL/min/1 73 square meters)    Stage 5 End Stage CKD (GFR <15 mL/min/1 73 square meters)  Note: GFR calculation is accurate only with a steady state creatinine    CBC and differential [[id number]] Collected:  12/18/19 2026    Lab Status:  Final result Specimen:  Blood from Arm, Right Updated:  12/18/19 2040     WBC 9 46 Thousand/uL      RBC 4 59 Million/uL      Hemoglobin 12 8 g/dL      Hematocrit 40 1 %      MCV 87 fL      MCH 27 9 pg      MCHC 31 9 g/dL      RDW 13 2 %      MPV 9 0 fL      Platelets 615 Thousands/uL      nRBC 0 /100 WBCs      Neutrophils Relative 53 %      Immat GRANS % 0 %      Lymphocytes Relative 37 %      Monocytes Relative 7 %      Eosinophils Relative 3 %      Basophils Relative 0 %      Neutrophils Absolute 4 95 Thousands/µL      Immature Grans Absolute 0 02 Thousand/uL      Lymphocytes Absolute 3 52 Thousands/µL      Monocytes Absolute 0 69 Thousand/µL      Eosinophils Absolute 0 24 Thousand/µL      Basophils Absolute 0 04 Thousands/µL                  XR lumbar spine 2 or 3 views   Final Result by Hilda Gibson MD (12/19 0827)      Possible pars defects at the lumbar sacral junction versus artifact      Oblique views and/or MRI may be helpful if symptoms persist given chronicity  The study was marked in EPIC for significant notification  Workstation performed: [id number]         XR tibia fibula 2 views LEFT   Final Result by Crow Chadwick MD (12/19 0831)      No acute osseous abnormality              Workstation performed: [id number]                    Procedures  Procedures         ED Course  ED Course as of Dec 24 2229   Wed Dec 18, 2019   2113 Called reading room for read                                  MDM  Number of Diagnoses or Management Options  Bilateral lower extremity edema:   Chronic low back pain:   Diagnosis management comments: 26 yo M with chronic low back pain- no red flags, reassuring exam, discussed symptom management, comprehensive spine program  - chronic b/l lower extremity edema, labs grossly unremarkable, recommend PCP f/u    Return precautions discussed with pt who expressed understanding with plan       Amount and/or Complexity of Data Reviewed  Clinical lab tests: ordered and reviewed  Tests in the radiology section of CPT®: ordered and reviewed  Tests in the medicine section of CPT®: ordered and reviewed          Disposition  Final diagnoses:   Chronic low back pain   Bilateral lower extremity edema - chronic     Time reflects when diagnosis was documented in both MDM as applicable and the Disposition within this note     Time User Action Codes Description Comment    12/18/2019  9:43 PM Harley Reilly Add [M54 5,  G89 29] Chronic low back pain     12/18/2019  9:43 PM Flower EDWARDS Add [R60 0] Bilateral lower extremity edema     12/18/2019  9:44 PM Flower EDWARDS Modify [R60 0] Bilateral lower extremity edema chronic      ED Disposition     ED Disposition Condition Date/Time Comment    Discharge Stable Wed Dec 18, 2019  9:43 PM Rigoberto Jaffe discharge to home/self care              Follow-up Information     Follow up With Specialties Details Why Contact Info Additional Information    Regina Harrell MD [facility]   [address] [facility], [address] [place] [id number]  [address] [facility]   [address], [address] [address]  [address], [address], [address], [place], [place] [address] Physical Therapy   [phone]          Discharge Medication List as of 12/18/2019  9:46 PM      CONTINUE these medications which have NOT CHANGED    Details   acetaminophen (TYLENOL) 500 mg tablet Take 1 tablet (500 mg total) by mouth every 6 (six) hours as needed for mild pain, moderate pain, headaches or fever, Starting Sat 2/9/2019, Print      ibuprofen (MOTRIN) 400 mg tablet Take 1 tablet (400 mg total) by mouth every 6 (six) hours as needed for mild pain, moderate pain or fever, Starting Sat 2/9/2019, Print               ED Provider  Electronically Signed by           Amaya Ken DO  12/24/19 7721

## 2019-12-19 NOTE — RESULT ENCOUNTER NOTE
Patient called back  Reviewed results and recommended additional imaging  Patient notes understanding

## 2019-12-19 NOTE — TELEPHONE ENCOUNTER
was used # [id number]  Pt's ph was busy, unable to leave a message, will need to call Pt back in 48 hrs per our protocol

## 2019-12-30 ENCOUNTER — TRANSCRIBE ORDERS (OUTPATIENT)
Dept: ADMINISTRATIVE | Facility: HOSPITAL | Age: 34
End: 2019-12-30

## 2019-12-30 ENCOUNTER — APPOINTMENT (OUTPATIENT)
Dept: LAB | Facility: HOSPITAL | Age: 34
End: 2019-12-30
Payer: COMMERCIAL

## 2019-12-30 DIAGNOSIS — L97.921 LOWER EXTREMITY ULCERATION, LEFT, LIMITED TO BREAKDOWN OF SKIN (HCC): ICD-10-CM

## 2019-12-30 DIAGNOSIS — Z72.0 TOBACCO ABUSE: ICD-10-CM

## 2019-12-30 DIAGNOSIS — I73.9 PVD (PERIPHERAL VASCULAR DISEASE) (HCC): ICD-10-CM

## 2019-12-30 LAB
CHOLEST SERPL-MCNC: 203 MG/DL (ref 50–200)
HDLC SERPL-MCNC: 34 MG/DL
LDLC SERPL CALC-MCNC: 128 MG/DL (ref 0–100)
NONHDLC SERPL-MCNC: 169 MG/DL
TRIGL SERPL-MCNC: 207 MG/DL

## 2019-12-30 PROCEDURE — 80061 LIPID PANEL: CPT

## 2019-12-30 PROCEDURE — 36415 COLL VENOUS BLD VENIPUNCTURE: CPT

## 2020-01-07 ENCOUNTER — NURSE TRIAGE (OUTPATIENT)
Dept: PHYSICAL THERAPY | Facility: OTHER | Age: 35
End: 2020-01-07

## 2020-01-07 DIAGNOSIS — G89.29 CHRONIC BILATERAL LOW BACK PAIN WITHOUT SCIATICA: Primary | ICD-10-CM

## 2020-01-07 DIAGNOSIS — M54.50 CHRONIC BILATERAL LOW BACK PAIN WITHOUT SCIATICA: Primary | ICD-10-CM

## 2020-01-07 NOTE — TELEPHONE ENCOUNTER
# [id number] Adis Wu)    Additional Information   Negative: Is this related to a work injury?  Negative: Is this related to an MVA?  Negative: Are you currently recieving homecare services?  Negative: Has the patient had unexplained weight loss?  Negative: Does the patient have a fever?  Negative: Is the patient experiencing blood in sputum?  Negative: Is the patient experiencing urine retention?  Affirmative: Is this a chronic condition?  Negative: Is the patient experiencing acute drop foot or paralysis?  Negative: Has the patient experienced major trauma? (fall from height, high speed collision, direct blow to spine) and is also experiencing nausea, light-headedness, or loss of consciousness? Background - Initial Assessment  Clinical complaint: Chronic low back pain, has some numbness and tingling with his extremities  Pain is midline, bilat legs swell, and needs help to get up out of bed  Pt has a very hard time ambulating, only walks short distances  Pt stated that he has had this for 5 years  Pt stated that he is taking no meds, just ice and heat  He has not had any neck/back surgeries  Date of onset:  yrs  Frequency of pain: constant  Quality of pain: burning, dull and sharp    Protocols used: [facility] COMPREHENSIVE SPINE PROGRAM PROTOCOL    This nurse did review in detail the comp spine program and what we can provide for the pt for their back pain  Pt is agreeable to being triaged by this nurse and would like to have physical therapy  Referrals were placed to the following:  Physical Therapy at the [facility] site  Pt was given the ph # to that location  PM&R with Fredy WRIGHT at [address] [facility] [address] at the [facility]  Pt is aware that they will be receiving a phone call from that office to make their appointments  Pt is aware of who they will be seeing and location of that office        No further questions voiced at this time and Pt did state understanding of the referral that was placed

## 2020-01-10 ENCOUNTER — TELEPHONE (OUTPATIENT)
Dept: PAIN MEDICINE | Facility: MEDICAL CENTER | Age: 35
End: 2020-01-10

## 2020-01-10 NOTE — TELEPHONE ENCOUNTER
Tajik speaker : pt called stating he is not able to make it to [facility] because it is too far away  Advised pt that to be seen with [facility] in [place] he needs a  provider to refer him in order for pt appt to be covered by insurance   Pt understood and will have his PCP place a referral

## 2020-01-14 ENCOUNTER — TRANSCRIBE ORDERS (OUTPATIENT)
Dept: ADMINISTRATIVE | Facility: HOSPITAL | Age: 35
End: 2020-01-14

## 2020-01-15 ENCOUNTER — EVALUATION (OUTPATIENT)
Dept: PHYSICAL THERAPY | Facility: CLINIC | Age: 35
End: 2020-01-15
Payer: COMMERCIAL

## 2020-01-15 DIAGNOSIS — G89.29 CHRONIC BILATERAL LOW BACK PAIN WITHOUT SCIATICA: Primary | ICD-10-CM

## 2020-01-15 DIAGNOSIS — M54.50 CHRONIC BILATERAL LOW BACK PAIN WITHOUT SCIATICA: Primary | ICD-10-CM

## 2020-01-15 PROCEDURE — 97162 PT EVAL MOD COMPLEX 30 MIN: CPT | Performed by: PHYSICAL THERAPIST

## 2020-01-15 NOTE — LETTER
January 15, 2020    Carroll Cason MD  [address] [place] [address], [place] [id number]    Patient: Wero Joseph  YOB: 1985   Date of Visit: 1/15/2020      Dear Dr Mary Mccullough:    One of your patients, Wero Joesph, was referred to me by the [facility] Comprehensive Spine program   Please see the evaluation summary attached below  If care is required beyond 30 days to help your patient reach their goals, I will send you a request for signature on the plan of care  If you have any questions or concerns, please don't hesitate to call  Sincerely,    Leigh Leonardo, PT      Primary Care Provider:      I certify that I have read the below Plan of Care and certify the need for these services furnished under this plan of treatment while under my care  Carroll Cason MD  [address] [place] [address], [place] [id number]  VIA Facsimile: [phone]           PT Evaluation     Today's date: 1/15/2020  Patient name: Wero Joseph  : 1985  MRN: [id number]  Referring provider: Magy Boone MD  Dx:   Encounter Diagnosis     ICD-10-CM    1  Chronic bilateral low back pain without sciatica M54 5 Ambulatory referral to PT spine    G89 29                   Assessment  Assessment details: Pt is a pleasant 27 y o  male presenting to outpatient physical therapy via the [facility] with Chronic bilateral low back pain without sciatica  (primary encounter diagnosis)   Pt presents with pain, decreased range of motion, decreased strength, decreased spinal accessory motion, and decreased tolerance to activity  Patient displays movement impairment diagnosis of lumbar strength deficits, consistent with TBC of lumbar motor coordination/stabilization category   Pt is a good candidate for outpatient physical therapy, including therapeutic exercise, manual mobilizations, neuromuscular education exercises, therapeutic activity training, and would benefit from skilled physical therapy to address limitations and to achieve goals  Thank you for this referral    Impairments: abnormal coordination, abnormal muscle firing, abnormal muscle tone, abnormal or restricted ROM, abnormal movement, activity intolerance, impaired physical strength, pain with function and poor posture   Understanding of Dx/Px/POC: good   Prognosis: good    Goals  ST  Patient will report 25% decrease in pain in 4 weeks  2  Patient will demonstrate 25% improvement in ROM in 4 weeks  3  Patient will demonstrate 1/2 grade improvement in strength in 4 weeks  LT  Patient will be able to perform IADLS without restriction or pain by discharge  2  Patient will be independent in HEP by discharge  3  Patient will be able to return to recreational/work duties without restriction or pain by discharge  Plan  Patient would benefit from: PT eval and skilled PT  Planned modality interventions: cryotherapy and thermotherapy: hydrocollator packs  Planned therapy interventions: IADL retraining, body mechanics training, flexibility, functional ROM exercises, home exercise program, neuromuscular re-education, manual therapy, postural training, strengthening, stretching, therapeutic activities, therapeutic exercise, joint mobilization, motor coordination training, activity modification, self care, patient education and abdominal trunk stabilization  Frequency: 2x week  Duration in visits: 4  Duration in weeks: 2  Treatment plan discussed with: patient        Subjective Evaluation    History of Present Illness  Mechanism of injury: 1/15: Communication with patient completed via hospital provided telephone Jamaican<>English interpretation services throughout evaluation  Pt reports several year history of low back pain, attributed to accident in [place] as well as work injury in [address]   However, pt reports symptoms has been worsening over the past several years  Reports he has consulted many specialists for low back pain, with several diagnostic scans performed  Notes he had therapy several years ago for hand pain  AGGS: sitting straight, lying supine >30 minutes, bending forward, household chores (using broom)  LOC: central lumbar region, with radiating symptoms to buttocks and bilat posterior LEs  States pain quality changes, sometimes aching, sharp, stabbing, burning, locking  States he experiences swelling in LEs  States he feels the pain is changing, now moving form low back to abdominal region  Reports he has many diagnostics scans to rule out other pathologic conditions  Unable to report if paresthesias or dysesthesias are present  EASES: none long lasting; has trialed heat, ice, massage  Pain  Current pain ratin  At best pain ratin  At worst pain rating: 10    Patient Goals  Patient goals for therapy: decreased pain and increased motion          Objective     Concurrent Complaints  Negative for bladder dysfunction, bowel dysfunction, history of trauma and infection    Palpation   Left   Tenderness of the erector spinae  Right   Tenderness of the erector spinae  Tenderness     Left Hip   Tenderness in the PSIS  Right Hip   No tenderness in the PSIS       Neurological Testing     Sensation     Lumbar   Left   Diminished: light touch    Right   Intact: light touch    Reflexes   Left   Patellar (L4): trace (1+)  Achilles (S1): trace (1+)  Clonus sign: negative    Right   Patellar (L4): trace (1+)  Achilles (S1): trace (1+)  Clonus sign: negative    Additional Neurological Details  1/15:  LOWER EXTREMITY MYOTOMES: Gross reduction in LE strength due to pain  LOWER EXTREMITY DERMATOMES: Decreased sensation reported left L3, L5, S2    Active Range of Motion     Lumbar   Flexion: 50 degrees  with pain  Extension: 10 degrees  with pain  Left lateral flexion: 20 degrees    with pain  Right lateral flexion: 20 degrees with pain  Left rotation:  with pain Restriction level: moderate  Right rotation:  with pain Restriction level: moderate    Joint Play     Pain: L1, L2, L3, L4 and L5     Strength/Myotome Testing     Left Hip   Planes of Motion   Flexion: 3+  Extension: 3+  Abduction: 4-    Right Hip   Planes of Motion   Flexion: 4-  Extension: 3+  Abduction: 4-    Left Knee   Flexion: 4-  Extension: 4-    Right Knee   Flexion: 4  Extension: 4    Left Ankle/Foot   Dorsiflexion: 4-  Plantar flexion: 4  Great toe extension: 4+    Right Ankle/Foot   Dorsiflexion: 4-  Plantar flexion: 4  Great toe extension: 4+    General Comments:      Hip Comments   1/15: Pain reported with SLR ~50*, passive hip flexion to ~80*; denies pain with passive IR/ER             Precautions: n/a    Date 1/15            FOTO IE            Re-eval IE              Daily Treatment Diary     Manual  1/15                                                   Exercise Diary  1/15           bike            Lumbar pball roll outs                        LTR            SKTC            H/L TA             H/L BKFO            H/L TA alt UE            H/L TA alt LE                              Modalities  1/15
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
## 2020-01-20 ENCOUNTER — OFFICE VISIT (OUTPATIENT)
Dept: PHYSICAL THERAPY | Facility: CLINIC | Age: 35
End: 2020-01-20
Payer: COMMERCIAL

## 2020-01-20 DIAGNOSIS — M54.50 CHRONIC BILATERAL LOW BACK PAIN WITHOUT SCIATICA: Primary | ICD-10-CM

## 2020-01-20 DIAGNOSIS — G89.29 CHRONIC BILATERAL LOW BACK PAIN WITHOUT SCIATICA: Primary | ICD-10-CM

## 2020-01-20 PROCEDURE — 97530 THERAPEUTIC ACTIVITIES: CPT | Performed by: PHYSICAL THERAPIST

## 2020-01-20 PROCEDURE — 97112 NEUROMUSCULAR REEDUCATION: CPT | Performed by: PHYSICAL THERAPIST

## 2020-01-20 PROCEDURE — 97110 THERAPEUTIC EXERCISES: CPT | Performed by: PHYSICAL THERAPIST

## 2020-01-20 NOTE — PROGRESS NOTES
Daily Note     Today's date: 2020  Patient name: Deshawn Mtz  : 1985  MRN: [id number]  Referring provider: Monica Garcia MD  Dx:   Encounter Diagnosis     ICD-10-CM    1  Chronic bilateral low back pain without sciatica M54 5     G89 [id number]                   Subjective: Pt comes to therapy reporting minimal changes since initial evaluation  Primary complaint upon arrival is related to cervical spine discomfort  Objective: See treatment diary below      Assessment: Tolerated treatment well  Patient demonstrated fatigue post treatment, exhibited good technique with therapeutic exercises and would benefit from continued PT  Required visual and verbal cues for proper form and dosage of exercises  Responded well to MHP at end of session  Plan: Progress treatment as tolerated         Precautions: n/a    Date 1/15 1/20           FOTO IE            Re-eval IE              Daily Treatment Diary     Manual  1/15 1/20                                                  Exercise Diary  1/15 1/20          bike  5 min          Lumbar pball roll outs  15"x5                      LTR  15"x5 ea          SKTC  15"x5 ea          H/L TA   5"x10          H/L BKFO  5"x10          H/L TA alt UE  5"x10 ea          H/L TA alt LE  5"x10 ea                            Modalities  1/15 1/20          MHP  10' seated
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
## 2020-01-22 ENCOUNTER — APPOINTMENT (OUTPATIENT)
Dept: LAB | Facility: CLINIC | Age: 35
End: 2020-01-22
Payer: COMMERCIAL

## 2020-01-22 ENCOUNTER — OFFICE VISIT (OUTPATIENT)
Dept: PAIN MEDICINE | Facility: CLINIC | Age: 35
End: 2020-01-22
Payer: COMMERCIAL

## 2020-01-22 VITALS
HEART RATE: 82 BPM | BODY MASS INDEX: 38.79 KG/M2 | DIASTOLIC BLOOD PRESSURE: 74 MMHG | HEIGHT: 62 IN | SYSTOLIC BLOOD PRESSURE: 116 MMHG

## 2020-01-22 DIAGNOSIS — E55.9 HYPOVITAMINOSIS D: ICD-10-CM

## 2020-01-22 DIAGNOSIS — M54.2 NECK PAIN: ICD-10-CM

## 2020-01-22 DIAGNOSIS — G89.29 CHRONIC BILATERAL LOW BACK PAIN, UNSPECIFIED WHETHER SCIATICA PRESENT: Primary | ICD-10-CM

## 2020-01-22 DIAGNOSIS — M79.18 MYOFASCIAL PAIN SYNDROME: ICD-10-CM

## 2020-01-22 DIAGNOSIS — M54.50 CHRONIC BILATERAL LOW BACK PAIN, UNSPECIFIED WHETHER SCIATICA PRESENT: Primary | ICD-10-CM

## 2020-01-22 LAB — 25(OH)D3 SERPL-MCNC: 5.2 NG/ML (ref 30–100)

## 2020-01-22 PROCEDURE — 82306 VITAMIN D 25 HYDROXY: CPT

## 2020-01-22 PROCEDURE — 36415 COLL VENOUS BLD VENIPUNCTURE: CPT

## 2020-01-22 PROCEDURE — 99204 OFFICE O/P NEW MOD 45 MIN: CPT | Performed by: PHYSICIAN ASSISTANT

## 2020-01-22 RX ORDER — METHOCARBAMOL 500 MG/1
TABLET, FILM COATED ORAL
Qty: 30 TABLET | Refills: 1 | Status: SHIPPED | OUTPATIENT
Start: 2020-01-22 | End: 2020-03-18 | Stop reason: ALTCHOICE

## 2020-01-22 RX ORDER — NAPROXEN 500 MG/1
500 TABLET ORAL 2 TIMES DAILY WITH MEALS
Qty: 30 TABLET | Refills: 1 | Status: SHIPPED | OUTPATIENT
Start: 2020-01-22 | End: 2020-03-18 | Stop reason: ALTCHOICE

## 2020-01-22 NOTE — PATIENT INSTRUCTIONS
Metocarbamol (Por la boca)   Sirve para tratar el dolor y los espasmos musculares  Malou(s) : Robaxin, Robaxin-750   Existen muchas otras marcas de Adama  Marsha medicamento no debe ser usado cuando:   No use marsha medicamento si alguna vez diaz tenido Euel [place]xt[place] reacción alérgica al [employer] o a medicamentos relacionados lawrence Norgesic® o [place]  Forma de usar marsha medicamento:   Tableta  · El médico le dirá cuánto medicamento jaylan, y la frecuencia con que debe hacerlo  · No tome donaldo dosis mayor ni con más frecuencia que lo ordenado por christy médico   Si donaldo dosis es olvidada:   · [place]ove la dosis [place] lo más pronto posible, si no ha pasado más de donaldo hora del horario habitual  Si diaz pasado más de [place], sáltese la dosis [place] y regrese a christy horario regular  · No use dos dosis al [employer]  Forma de guardar y botar marsha medicamento:   · Guarde a temperatura ambiente, lejos del calor, la humedad y la darian directa  · Guarde todos los medicamentos fuera del alcance de los niños  Medicamentos y [employer] que debe evitar:   Consulte con christy médico o farmacéutico antes de usar cualquier medicamento, incluyendo los que compra sin receta médica, las vitaminas y los productos herbales  · No moises alcohol mientras use marsha medicamento  · Informe al médico si usted está usando cualquier medicamento que pueda causarle sueño lawrence las píldoras para dormir, sedantes, antidepresivos, medicamentos para el resfriado, alergia o dolor  Precauciones leslie el uso de marsha medicamento:   · Informe a christy medico si esta embarazada o dando de lactar  · Dueñas puede causar sueño o mareo  Tenga cuidado si maneja un automóvil o al [employer]  · Dueñas puede produce que christy orina se torne [place], preethi o kedar  [place] no es razón para preocuparse    Efectos secundarios que pueden presentarse leslie el uso de marsha medicamento:   Consulte inmediatamente con el médico si nota cualquiera de [place] efectos secundarios:  · Dificultad para respirar  · Sarpullido a la piel, ronchas o picazón  · Dificultad para hablar con claridad o sueño severo  · Fiebre o escalofríos  Consulte con el médico si nota los siguientes efectos secundarios menos graves:   · Dolor de lakeisha  · Sueño o [place] Avinash  · Náuseas  · Pérdida del apetito o sabor metálico  · Nariz congestionada  Consulte con el médico si nota otros efectos secundarios que neil son causados por sury medicamento  Llame a smith médico para consultarle Dane Grubbs puede notificar raj efectos secundarios al FDA al [id number]  © 2017 2600 Harry Thomas Information is for End User's use only and may not be sold, redistributed or otherwise used for commercial purposes  Esta información es sólo para uso en educación  Smith intención no es darle un consejo médico sobre enfermedades o tratamientos  Colsulte con smith Kermitt Console farmacéutico antes de seguir cualquier régimen médico para saber si es seguro y efectivo para usted  Naproxeno (Por la boca)   Se [place] para tratar la fiebre y el dolor  También trata la artritis, la gota y el dolor y los calambres Anloy  Sury es un medicamento antiinflamatorio no esteroideo (BERTRAND)  Malou(s) : Aleve, Aleve Arthritis, All Day Pain Relief, All Day Relief, Anaprox, Anaprox DS, EC Naprosyn, Flanax Pain Relief Kit, [facility] All Day Pain Relief, Good Sense All Day Pain Relief, Good Sense Naproxen Sodium, Leader All Day Pain Relief, Mediproxen, Naprelan, NaproPax   Existen muchas otras marcas de Adama  Sury medicamento no debe ser usado cuando:   Sury medicamento no es adecuado para todas las personas  No lo use si usted alguna vez ha tenido donaldo reacción alérgica (incluyendo asma) al naproxeno, a la aspirina o a otros medicamentos BERTRAND  No lo use si usted ha tenido [facility] en el corazón (lawrence cirugía de bypass coronario)    Megan Finger de usar sury medicamento:   Brandon Robles de líquido, [place], [place], [place], Tableta de liberación prolongada  · Christy médico le indicara cuanto medicamento necesita usar  No use más medicamento de lo indicado  · Tómese sury medicamento con alimentos o leche para no causar malestar estomacal  Tómese un vaso lleno de agua después de cada dosis  · Tableta de liberación prolongada: Tráguela entera  No triture, rompa o mastique  · Solución oral: Agite merari shlomo antes de medir la dosis  Mida el líquido oral con Kaleta Darell, [place] para uso oral o taza especialmente marcadas para medir medicamentos  · [address] medicamento si usted está usando sury medicamento sin prescripción médica  · [facility] debe venir con Addy Coleman Guía del medicamento  Solicite donaldo copia con christy farmacéutico en laila de no tener la guía  · Si olvida donaldo dosis: Si olvida donaldo dosis de christy medicamento, tómelo lo más pronto posible  Si es jeannine la hora para christy próxima dosis, espere hasta entonces para jaylan christy dosis regular  No use medicamento adicional para reponer la dosis olvidada  · Guarde el medicamento en un recipiente cerrado a temperatura ambiente y alejado del calor, la humedad y la darian directa  Solución oral: No lo congele  Medicamentos y [employer] que debe evitar:   Consulte con christy médico o farmacéutico antes de usar cualquier medicamento, incluyendo los que compra sin receta médica, las vitaminas y los productos herbales  · No use ningún otro medicamento BERTRAND a no ser que christy médico lo autorice  Algunos otros medicamentos BERTRAND son aspirina, celecoxib, diclofenac, diflunisal, ibuprofeno o salsalato  · Algunos medicamentos podrían afectar la función de naproxeno  Informe a christy médico si está usando cualquiera de los siguientes:  ¨ Colestiramina, ciclosporina, digoxina, litio, metotrexato, probenecida, sucralfato    ¨ Antiácidos  ¨ Medicamentos para la presión arterial  ¨ Medicamentos para la presión arterial  ¨ Diurético  ¨ Medicamento para tratar la depresión  ¨ Medicamentos esteroideos  · No consuma alcohol mientras esté   Precauciones leslie el uso de marsha medicamento:   · Informe a christy médico si está embarazada o amamantando  No use marsha medicamento leslie la última etapa del embarazo a menos que sea indicado por christy médico   · Informe a christy médico si usted tiene enfermedad renal, enfermedad hepática, anemia, asma, problemas de sangrado, presión arterial madhuri, insuficiencia cardíaca, ha sufrido un ataque al corazón reciente, o tiene antecedentes de problemas de estómago o del intestino (incluso sangrado o úlceras)  Dígale a christy médico si usted fuma o drew alcohol  · Marsha medicamento puede causar los siguientes problemas:  ¨ Riesgo alto de coágulos de [facility], ataque al corazón, derrame cerebral o insuficiencia cardíaca  ¨ Sangrado y [place] en christy estómago o intestinos  ¨ Daño hepático  ¨ Daño renal  ¨ Niveles altos de potasio en la todd  ¨ Reacciones graves en la piel  · Llame a christy médico si los síntomas empeoran, el dolor dura más de 10 días o si la fiebre dura más de 3 días  · Informe al médico o dentista encargado de atenderle que usted está usando marsha medicamento, especialmente si usted tiene [place] o un procedimiento  · Marsha medicamento podría causarle a usted mareos o somnolencia  No maneje un vehículo ni la ninguna tarea que pueda ser peligrosa hasta que usted sepa cómo lo afecta marsha medicamento  · La ovulación puede atrasarse en algunas mujeres mientras usan marsha medicamento  Hable con christy médico si usted tiene inquietudes al [employer]  · Dígale a todo médico o dentista encargado de atenderle que usted está usando Truveris  Puede que marsha medicamento afecte algunos resultados de [employer]  · El médico solicitará exámenes de laboratorio leslie las citas de rutina para revisar los efectos de Truveris  Asista a todas raj citas    · Guarde todos los medicamentos fuera del alcance de los niños  Nunca comparta raj medicamentos con [facility]  Efectos secundarios que pueden presentarse leslie el uso de sury medicamento:   Consulte inmediatamente con el médico si nota cualquiera de estos efectos secundarios:  · Reacción alérgica: Comezón o ronchas, hinchazón del grazyna o las quinten, hinchazón u hormigueo en la boca o garganta, opresión en el pecho, dificultad para respirar  · Ampollas, despellejamiento, sarpullido alcaraz en la piel  · Evacuaciones intestinales con todd o de color king y alquitranadas, shaovn dolor de estómago, vómitos con todd o con apariencia de café molido  · Cambio en la cantidad y frecuencia con la que orina  · Dolor en el pecho que puede propagarse, dificultad para respirar, sudoración inusual, desmayos  · Orina oscura o heces pálidas, náuseas, vómitos, falta de apetito, dolor estomacal, coloración amarillenta en la piel u ojos  · Adormecimiento o debilidad en un lado del cuerpo, dolor de lakeisha repentino o severo, problemas con el habla, la visión o para caminar  · Aumento rápido de peso, inflamación en raj quinten, tobillos, o pies  · Sangrados, moretones o debilidad inusuales  · Cambios en la visión  Consulte con el médico si nota los siguientes efectos secundarios menos graves:   · [place], [place] o estreñimiento leves  · Zumbido en raj oídos, mareo, dolor de lakeisha  Consulte con el médico si nota otros efectos secundarios que neil son causados por sury medicamento  Llame a smith médico para consultarle Dane Grubbs puede notificar raj efectos secundarios al FDA al 4-636-TWR-[id number]  © 2017 2600 Harry  Information is for End User's use only and may not be sold, redistributed or otherwise used for commercial purposes  Esta información es sólo para uso en educación  Smith intención no es darle un consejo médico sobre enfermedades o tratamientos   Colsulte con smith [place] farmacéutico antes de seguir cualquier [place] médico para saber si es seguro y efectivo para usted

## 2020-01-23 ENCOUNTER — EVALUATION (OUTPATIENT)
Dept: PHYSICAL THERAPY | Facility: CLINIC | Age: 35
End: 2020-01-23
Payer: COMMERCIAL

## 2020-01-23 DIAGNOSIS — G89.29 CHRONIC BILATERAL LOW BACK PAIN WITHOUT SCIATICA: Primary | ICD-10-CM

## 2020-01-23 DIAGNOSIS — M54.50 CHRONIC BILATERAL LOW BACK PAIN WITHOUT SCIATICA: Primary | ICD-10-CM

## 2020-01-23 DIAGNOSIS — E55.9 VITAMIN D DEFICIENCY: Primary | ICD-10-CM

## 2020-01-23 PROCEDURE — 97140 MANUAL THERAPY 1/> REGIONS: CPT | Performed by: PHYSICAL THERAPIST

## 2020-01-23 RX ORDER — CHOLECALCIFEROL (VITAMIN D3) 1250 MCG
50000 CAPSULE ORAL WEEKLY
Qty: 8 CAPSULE | Refills: 0 | Status: SHIPPED | OUTPATIENT
Start: 2020-01-23 | End: 2020-03-18

## 2020-01-23 NOTE — PROGRESS NOTES
PT Evaluation     Today's date: 2020  Patient name: Franck Brown  : 1985  MRN: [id number]  Referring provider: Arturo Reeves MD  Dx:   Encounter Diagnosis     ICD-10-CM    1  Chronic bilateral low back pain without sciatica M54 5     G89 29                   Assessment  Assessment details: Pt is a pleasant 27 y o  male presenting to outpatient physical therapy with Chronic bilateral low back pain without sciatica  (primary encounter diagnosis)   Pt presents with pain, decreased range of motion, decreased strength, and decreased tolerance to activity  Displays movement impairment diagnosis of cervical spine hypomobility dysfunction  Will plan to add cervical spine impairments to current lumbar spine POC  Pt is a good candidate for outpatient physical therapy and would benefit from skilled physical therapy to address limitations and to achieve goals  Thank you for this referral    Impairments: abnormal coordination, abnormal muscle firing, abnormal muscle tone, abnormal or restricted ROM, abnormal movement, activity intolerance, impaired physical strength, pain with function and poor posture   Understanding of Dx/Px/POC: good   Prognosis: good    Goals  ST  Patient will report 25% decrease in pain in 4 weeks  2  Patient will demonstrate 25% improvement in ROM in 4 weeks  3  Patient will demonstrate 1/2 grade improvement in strength in 4 weeks  LT  Patient will be able to perform IADLS without restriction or pain by discharge  2  Patient will be independent in HEP by discharge  3  Patient will be able to return to recreational/work duties without restriction or pain by discharge        Plan  Patient would benefit from: PT eval and skilled PT  Planned modality interventions: cryotherapy and thermotherapy: hydrocollator packs  Planned therapy interventions: IADL retraining, body mechanics training, flexibility, functional ROM exercises, home exercise program, neuromuscular re-education, manual therapy, postural training, strengthening, stretching, therapeutic activities, therapeutic exercise, joint mobilization, motor coordination training, activity modification, self care, patient education and abdominal trunk stabilization  Frequency: 2x week  Duration in visits: 8  Duration in weeks: 4  Treatment plan discussed with: patient        Subjective Evaluation    History of Present Illness  Mechanism of injury: 1/15: Communication with patient completed via hospital provided telephone Turkmen<>English interpretation services throughout evaluation  Pt reports several year history of low back pain, attributed to accident in [place] as well as work injury in [address] [place] [address] [address]  However, pt reports symptoms has been worsening over the past several years  Reports he has consulted many specialists for low back pain, with several diagnostic scans performed  Notes he had therapy several years ago for hand pain  AGGS: sitting straight, lying supine >30 minutes, bending forward, household chores (using broom)  LOC: central lumbar region, with radiating symptoms to buttocks and bilat posterior LEs  States pain quality changes, sometimes aching, sharp, stabbing, burning, locking  States he experiences swelling in LEs  States he feels the pain is changing, now moving form low back to abdominal region  Reports he has many diagnostics scans to rule out other pathologic conditions  Unable to report if paresthesias or dysesthesias are present  EASES: none long lasting; has trialed heat, ice, massage  1/23: Pt reports chronic history of cervcial spine pain  Reports symptoms are constant , however, worse with any movement of neck  AGGS: movement of neck  LOC: central cervical spine, radiating to occipital region, as well as bilateral shoulders  Describes symptoms as sharp, as well as tingling  EASES: heat, medications    Reports multiple injections in bilateral arms, shoulder, neck, and low back, however, denies long-lasting relief  Notes he is not taking prescribed medications, however, was prescribed new med as of yesterday  Follows up with physician in 6 weeks  Pain  Current pain ratin  At best pain ratin  At worst pain rating: 10    Patient Goals  Patient goals for therapy: decreased pain and increased motion          Objective     Concurrent Complaints  Negative for bladder dysfunction, bowel dysfunction, history of trauma and infection    Palpation   Left   No palpable tenderness to the cervical paraspinals  Tenderness of the erector spinae and upper trapezius  Right   No palpable tenderness to the cervical paraspinals  Tenderness of the erector spinae and upper trapezius  Tenderness     Left Hip   Tenderness in the PSIS  Right Hip   No tenderness in the PSIS       Neurological Testing     Sensation     Lumbar   Left   Diminished: light touch    Right   Intact: light touch    Reflexes   Left   Patellar (L4): trace (1+)  Achilles (S1): trace (1+)  Clonus sign: negative    Right   Patellar (L4): trace (1+)  Achilles (S1): trace (1+)  Clonus sign: negative    Additional Neurological Details  1/15:  LOWER EXTREMITY MYOTOMES: Gross reduction in LE strength due to pain  LOWER EXTREMITY DERMATOMES: Decreased sensation reported left L3, L5, S2    Active Range of Motion   Cervical/Thoracic Spine       Cervical    Flexion: 50 degrees  with pain  Extension: 15 degrees     with pain  Left lateral flexion: 20 degrees     with pain  Right lateral flexion: 40 degrees     with pain  Left rotation: 50 degrees with pain  Right rotation: 60 degrees    with pain    Lumbar   Flexion: 50 degrees  with pain  Extension: 10 degrees  with pain  Left lateral flexion: 20 degrees    with pain  Right lateral flexion: 20 degrees  with pain  Left rotation:  with pain Restriction level: moderate  Right rotation:  with pain Restriction level: moderate    Additional Active Range of Motion Details  CERVICAL AROM -  Flexion, extension, bilat lateral flexion measured with bubble inclinometer on crown; Rotation measured with goniometer and patient seated      Joint Play     Hypomobile: C2, C3, C4, C5, C6, C7, T1, T2, T3, T4, T5, T6, T7 and T8     Pain: C5, C6, C7, T1, T2, T3, T4, T5, L1, L2, L3, L4 and L5     Strength/Myotome Testing     Left Shoulder     Planes of Motion   Flexion: 3-   Abduction: 3-   External rotation at 0°: 3+   Internal rotation at 0°: 3+     Right Shoulder     Planes of Motion   Flexion: 3-   Abduction: 3-   External rotation at 0°: 3+   Internal rotation at 0°: 3+     Left Hip   Planes of Motion   Flexion: 3+  Extension: 3+  Abduction: 4-    Right Hip   Planes of Motion   Flexion: 4-  Extension: 3+  Abduction: 4-    Left Knee   Flexion: 4-  Extension: 4-    Right Knee   Flexion: 4  Extension: 4    Left Ankle/Foot   Dorsiflexion: 4-  Plantar flexion: 4  Great toe extension: 4+    Right Ankle/Foot   Dorsiflexion: 4-  Plantar flexion: 4  Great toe extension: 4+    General Comments:      Hip Comments   1/15: Pain reported with SLR ~50*, passive hip flexion to ~80*; denies pain with passive IR/ER             Precautions: n/a    Date 1/15 1/20 1/23          FOTO IE  CS          Re-eval IE  CINTHYA-CS            Daily Treatment Diary     Manual  1/15 1/20 1/23                                                 Exercise Diary  1/15 1/20 1/23         bike  5 min          Lumbar pball roll outs  15"x5                      LTR  15"x5 ea          SKTC  15"x5 ea          H/L TA   5"x10          H/L BKFO  5"x10          H/L TA alt UE  5"x10 ea          H/L TA alt LE  5"x10 ea                      UT stretch B  30"x3ea          Lev scap stretch B  30"x3ea          Rhomboid stretch  30"x3          TS seated extension  5"x10                Modalities  1/15 1/20 1/23         MHP  10' seated 10' seated

## 2020-01-27 ENCOUNTER — OFFICE VISIT (OUTPATIENT)
Dept: PHYSICAL THERAPY | Facility: CLINIC | Age: 35
End: 2020-01-27
Payer: COMMERCIAL

## 2020-01-27 ENCOUNTER — HOSPITAL ENCOUNTER (OUTPATIENT)
Dept: RADIOLOGY | Facility: HOSPITAL | Age: 35
Discharge: HOME | End: 2020-01-27
Payer: COMMERCIAL

## 2020-01-27 DIAGNOSIS — G89.29 CHRONIC BILATERAL LOW BACK PAIN WITHOUT SCIATICA: Primary | ICD-10-CM

## 2020-01-27 DIAGNOSIS — M54.50 CHRONIC BILATERAL LOW BACK PAIN WITHOUT SCIATICA: Primary | ICD-10-CM

## 2020-01-27 DIAGNOSIS — M54.2 NECK PAIN: ICD-10-CM

## 2020-01-27 DIAGNOSIS — G89.29 CHRONIC BILATERAL LOW BACK PAIN, UNSPECIFIED WHETHER SCIATICA PRESENT: ICD-10-CM

## 2020-01-27 DIAGNOSIS — M54.50 CHRONIC BILATERAL LOW BACK PAIN, UNSPECIFIED WHETHER SCIATICA PRESENT: ICD-10-CM

## 2020-01-27 PROCEDURE — 97110 THERAPEUTIC EXERCISES: CPT | Performed by: PHYSICAL THERAPY ASSISTANT

## 2020-01-27 PROCEDURE — 72114 X-RAY EXAM L-S SPINE BENDING: CPT

## 2020-01-27 PROCEDURE — 97112 NEUROMUSCULAR REEDUCATION: CPT

## 2020-01-27 PROCEDURE — 72050 X-RAY EXAM NECK SPINE 4/5VWS: CPT

## 2020-01-27 NOTE — PROGRESS NOTES
Daily Note     Today's date: 2020  Patient name: Lucian Burton  : 1985  MRN: [id number]  Referring provider: Guanakito Sherman MD  Dx:   Encounter Diagnosis     ICD-10-CM    1  Chronic bilateral low back pain without sciatica M54 5     G89 29        Start Time: 0900  Stop Time: 0955  Total time in clinic (min): 55 minutes  Pt supervised by PTA, RK: [phone]  Subjective: Pt reports continued back pain Sx /c greatest pain at night when he goes to sleep  Objective: See treatment diary below    Assessment: Pt tolerated treatment fair/good requiring increased time transferring between H/L/supine/seated position  Pt demonstrated good carryover of TE program from LV  Pt would benefit from continued PT to improve lower back and cervical pain Sx, mobility and strength  Plan: Cont /c PT POC  Progress as tolerated        Precautions: n/a  Date 1/15 1/20 1/23 1/27         FOTO IE  CS          Re-eval IE  CINTHYA-CS            Daily Treatment Diary     Manual  1/15 1/20 1/23 1/27                                                Exercise Diary  1/15 1/20 1/23 1/27        bike  5 min  L1 7'         Lumbar pball roll outs  15"x5  15"x5ea                    LTR  15"x5 ea  15"x5 ea /c MHP        SKTC  15"x5 ea  15"x5 ea /c MHP        H/L TA   5"x10  5"x10 /c MHP        H/L BKFO  5"x10  15"x5 ea /c MHP        H/L TA alt UE  5"x10 ea  3"x15ea /c MHP        H/L TA alt LE  5"x10 ea  3"x15ea /c MHP                    UT stretch B  30"x3ea  30"x3ea        Lev scap stretch B  30"x3ea  30"x3ea        Rhomboid stretch  30"x3  30"x3        TS seated extension  5"x10  5"x15              Modalities  1/15 1/20 1/23 1/27        MHP  10' seated 10' seated 10' H/L + TE                        Jina Hill PTA

## 2020-01-30 ENCOUNTER — OFFICE VISIT (OUTPATIENT)
Dept: PHYSICAL THERAPY | Facility: CLINIC | Age: 35
End: 2020-01-30
Payer: COMMERCIAL

## 2020-01-30 DIAGNOSIS — G89.29 CHRONIC BILATERAL LOW BACK PAIN WITHOUT SCIATICA: Primary | ICD-10-CM

## 2020-01-30 DIAGNOSIS — M54.50 CHRONIC BILATERAL LOW BACK PAIN WITHOUT SCIATICA: Primary | ICD-10-CM

## 2020-01-30 PROCEDURE — 97110 THERAPEUTIC EXERCISES: CPT

## 2020-01-30 PROCEDURE — 97112 NEUROMUSCULAR REEDUCATION: CPT

## 2020-01-30 NOTE — PROGRESS NOTES
Daily Note     Today's date: 2020  Patient name: Adan Gomez  : 1985  MRN: [id number]  Referring provider: Feli Davies MD  Dx:   Encounter Diagnosis     ICD-10-CM    1  Chronic bilateral low back pain without sciatica M54 5     G89 29                   Subjective: Pt reports no change in pain since LV and says that he had XRays done that day and is awaiting results  He continues to note most significant neck and back pain at night and upon waking in the morning  No change noted throughout session, though he does state that he likes the MHP  Objective: See treatment diary below     Precautions: n/a    Date 1/15 1/20 1/23 1/27 1/30        FOTO IE  CS          Re-eval IE  CINTHYA-CS            Exercise Diary  1/15 1/20 1/23 1/27 1/30        bike  5 min  L1 7'  L1 5'        Lumbar pball roll outs  15"x5  15"x5ea 10"x10 ea `                    LTR  15"x5 ea  15"x5 ea /c MHP 10"x10 ea MHP        SKTC  15"x5 ea  15"x5 ea /c MHP 20"x5 ea MHP        H/L TA   5"x10  5"x10 /c MHP 5"x20 MHP        H/L BKFO  5"x10  15"x5 ea /c MHP 5"x15 ea MHP        H/L TA alt UE  5"x10 ea  3"x15ea /c MHP         H/L TA alt LE  5"x10 ea  3"x15ea /c MHP                      UT stretch B  30"x3ea  30"x3ea 30"x3 ea        Lev scap stretch B  30"x3ea  30"x3ea 30"x3 ea        Rhomboid stretch  30"x3  30"x3 20"x5        TS seated extension  5"x10  5"x15 10"x10  1/2 foam          Modalities  1/15 1/20 1/23 1/27 1/30        MHP  10' seated 10' seated 10' H/L + TE                          Assessment: Tolerated treatment well  Patient demonstrated fatigue post treatment, exhibited good technique with therapeutic exercises and would benefit from continued PT to decrease pain in lumbar and cervical spine and improve lumbar mobility  Plan: Continue per plan of care  Progress treatment as tolerated      Davonte Lindsay, PTA

## 2020-02-03 ENCOUNTER — OFFICE VISIT (OUTPATIENT)
Dept: PHYSICAL THERAPY | Facility: CLINIC | Age: 35
End: 2020-02-03
Payer: COMMERCIAL

## 2020-02-03 ENCOUNTER — TELEPHONE (OUTPATIENT)
Dept: PAIN MEDICINE | Facility: CLINIC | Age: 35
End: 2020-02-03

## 2020-02-03 DIAGNOSIS — G89.29 CHRONIC BILATERAL LOW BACK PAIN WITHOUT SCIATICA: Primary | ICD-10-CM

## 2020-02-03 DIAGNOSIS — M54.50 CHRONIC BILATERAL LOW BACK PAIN WITHOUT SCIATICA: Primary | ICD-10-CM

## 2020-02-03 PROCEDURE — 97112 NEUROMUSCULAR REEDUCATION: CPT | Performed by: PHYSICAL THERAPIST

## 2020-02-03 PROCEDURE — 97110 THERAPEUTIC EXERCISES: CPT | Performed by: PHYSICAL THERAPIST

## 2020-02-03 PROCEDURE — 97010 HOT OR COLD PACKS THERAPY: CPT | Performed by: PHYSICAL THERAPIST

## 2020-02-03 NOTE — TELEPHONE ENCOUNTER
----- Message from Uche Mejia PA-C sent at 2/3/2020 11:52 AM EST -----  Please call patient and advise XR of cervical and lumbar spine were essentially normal  There was some signs of muscle spasm in the cervical spine which can be improved with physical therapy and continuing the Vitamin D supplement as prescribed

## 2020-02-03 NOTE — PROGRESS NOTES
Daily Note     Today's date: 2/3/2020  Patient name: Marino Lee  : 1985  MRN: [id number]  Referring provider: Boubacar Brown MD  Dx:   Encounter Diagnosis     ICD-10-CM    1  Chronic bilateral low back pain without sciatica M54 5     G89 29                   Subjective: Pt reports continued discomfort in neck and low back  States he has short term relief after therapy sessions  Objective: See treatment diary below      Assessment: Tolerated treatment well  Patient exhibited good technique with therapeutic exercises and would benefit from continued PT      Plan: Progress treatment as tolerated         Precautions: n/a    Date 1/15 1/20 1/23 1/27 1/30 2/3       FOTO IE  CS          Re-eval IE  CINTHYA-CS            Exercise Diary  1/15 1/20 1/23 1/27 1/30 2/3       bike  5 min  L1 7'  L1 5' L1 5'        Lumbar pball roll outs  15"x5  15"x5ea 10"x10 ea `10"x5 ea                    LTR  15"x5 ea  15"x5 ea /c MHP 10"x10 ea MHP 15"x5 ea       SKTC  15"x5 ea  15"x5 ea /c MHP 20"x5 ea MHP 15"x5 ea       H/L TA   5"x10  5"x10 /c MHP 5"x20 MHP 5"x20       H/L BKFO  5"x10  15"x5 ea /c MHP 5"x15 ea MHP D/C       H/L TA alt UE  5"x10 ea  3"x15ea /c MHP         H/L TA alt LE  5"x10 ea  3"x15ea /c MHP         H/L TA alt UE/LE      5"x10 ea                    UT stretch B  30"x3ea  30"x3ea 30"x3 ea 30"x3 ea       Lev scap stretch B  30"x3ea  30"x3ea 30"x3 ea 30"x3 ea       Rhomboid stretch  30"x3  30"x3 20"x5 30"x3 ea       TS seated extension  5"x10  5"x15 10"x10  1/2 foam np         Modalities  1/15 1/20 1/23 1/27 1/30 2/3       MHP  10' seated 10' seated 10' H/L + TE  x10' pre

## 2020-02-03 NOTE — TELEPHONE ENCOUNTER
Could not leave vm for pt  If pt calls back can someone in the phone room that speaks Nicaraguan please read the msg for the pt thanks

## 2020-02-06 ENCOUNTER — OFFICE VISIT (OUTPATIENT)
Dept: PHYSICAL THERAPY | Facility: CLINIC | Age: 35
End: 2020-02-06
Payer: COMMERCIAL

## 2020-02-06 DIAGNOSIS — M54.50 CHRONIC BILATERAL LOW BACK PAIN WITHOUT SCIATICA: Primary | ICD-10-CM

## 2020-02-06 DIAGNOSIS — G89.29 CHRONIC BILATERAL LOW BACK PAIN WITHOUT SCIATICA: Primary | ICD-10-CM

## 2020-02-06 DIAGNOSIS — M54.2 CERVICALGIA: ICD-10-CM

## 2020-02-06 PROCEDURE — 97140 MANUAL THERAPY 1/> REGIONS: CPT

## 2020-02-06 PROCEDURE — 97110 THERAPEUTIC EXERCISES: CPT

## 2020-02-06 PROCEDURE — 97112 NEUROMUSCULAR REEDUCATION: CPT

## 2020-02-06 NOTE — PROGRESS NOTES
Daily Note     Today's date: 2020  Patient name: Roland Perry  : 1985  MRN: [id number]  Referring provider: Ansley Winters MD  Dx:   Encounter Diagnosis     ICD-10-CM    1  Chronic bilateral low back pain without sciatica M54 5     G89 29    2  Cervicalgia M54 2                   Subjective: Pt reports that he has continued sharp pain with palpation to L PSIS and L lumbar PSM  He notes decrease in neck pain post added manuals of SOR and TPR to b/l c-spine musculature  Objective: See treatment diary below     Precautions: n/a    Date 1/15 1/20 1/23 1/27 1/30 2/3 2/6      FOTO IE  CS          Re-eval IE  CINTHYA-CS            Manual  1/15 1/20 1/23 1/27 1/30 2/3 2/6      TPR to c-spine       [address]      SOR       [address]        Exercise Diary  1/15 1/20 1/23 1/27 1/30 2/3 2/6      bike  5 min  L1 7'  L1 5' L1 5'        Lumbar pball roll outs  15"x5  15"x5ea 10"x10 ea 10"x5 ea 10"x5 ea                   LTR  15"x5 ea  15"x5 ea /c MHP 10"x10 ea MHP 15"x5 ea 10"x10 MHP      SKTC  15"x5 ea  15"x5 ea /c MHP 20"x5 ea MHP 15"x5 ea 20"x5 ea      H/L TA   5"x10  5"x10 /c MHP 5"x20 MHP 5"x20 5"x20      H/L BKFO  5"x10  15"x5 ea /c MHP 5"x15 ea MHP D/C -      H/L TA alt UE  5"x10 ea  3"x15ea /c MHP   -      H/L TA alt LE  5"x10 ea  3"x15ea /c MHP   -      H/L TA alt UE/LE      5"x10 ea 5"x10 ea                   UT stretch B  30"x3ea  30"x3ea 30"x3 ea 30"x3 ea 30"x3 ea      Lev scap stretch B  30"x3ea  30"x3ea 30"x3 ea 30"x3 ea 30"x3 ea      Rhomboid stretch  30"x3  30"x3 20"x5 30"x3 ea 20"x5      TS seated extension  5"x10  5"x15 10"x10  1 foam np         Modalities  1/15 1/20 1/23 1/27 1/30 2/3 2/6      MHP  10' seated 10' seated 10' H/L + TE  x10' pre C/s, l/s 10' pre supine                       Assessment: Tolerated treatment well   Patient demonstrated fatigue post treatment, exhibited good technique with therapeutic exercises and would benefit from continued PT to address muscle spasms in neck and reduce pain and tenderness in low back  Plan: Continue per plan of care  Progress treatment as tolerated      Matt Torito, PTA

## 2020-02-10 ENCOUNTER — OFFICE VISIT (OUTPATIENT)
Dept: PHYSICAL THERAPY | Facility: CLINIC | Age: 35
End: 2020-02-10
Payer: COMMERCIAL

## 2020-02-10 DIAGNOSIS — M54.2 CERVICALGIA: ICD-10-CM

## 2020-02-10 DIAGNOSIS — M54.50 CHRONIC BILATERAL LOW BACK PAIN WITHOUT SCIATICA: Primary | ICD-10-CM

## 2020-02-10 DIAGNOSIS — G89.29 CHRONIC BILATERAL LOW BACK PAIN WITHOUT SCIATICA: Primary | ICD-10-CM

## 2020-02-10 PROCEDURE — 97112 NEUROMUSCULAR REEDUCATION: CPT | Performed by: PHYSICAL THERAPIST

## 2020-02-10 PROCEDURE — 97530 THERAPEUTIC ACTIVITIES: CPT | Performed by: PHYSICAL THERAPIST

## 2020-02-10 PROCEDURE — 97110 THERAPEUTIC EXERCISES: CPT | Performed by: PHYSICAL THERAPIST

## 2020-02-10 NOTE — PROGRESS NOTES
Daily Note     Today's date: 2/10/2020  Patient name: Zhang Barrios  : 1985  MRN: [id number]  Referring provider: Génesis Molina MD  Dx:   Encounter Diagnosis     ICD-10-CM    1  Chronic bilateral low back pain without sciatica M54 5     G89 29    2  Cervicalgia M54 2                   Subjective: Pt comes to therapy with no new complaints  Objective: See treatment diary below      Assessment: Tolerated treatment well  Patient exhibited good technique with therapeutic exercises and would benefit from continued PT      Plan: Progress treatment as tolerated         Precautions: n/a    Date 1/15 1/20 1/23 1/27 1/30 2/3 2/6 2/10     FOTO IE  CS          Re-eval IE  CINTHYA-CS            Manual  1/15 1/20 1/23 1/27 1/30 2/3 2/6 2/10     TPR to c-spine       [facility]      [facility]        Exercise Diary  1/15 1/20 1/23 1/27 1/30 2/3 2/6 2/10     bike  5 min  L1 7'  L1 5' L1 5'        Lumbar pball roll outs  15"x5  15"x5ea 10"x10 ea 10"x5 ea 10"x5 ea 10"x5 ea                  LTR  15"x5 ea  15"x5 ea /c MHP 10"x10 ea MHP 15"x5 ea 10"x10 MHP 15"x5 ea     SKTC  15"x5 ea  15"x5 ea /c MHP 20"x5 ea MHP 15"x5 ea 20"x5 ea 20"x5 ea     H/L TA   5"x10  5"x10 /c MHP 5"x20 MHP 5"x20 5"x20 5"x20     H/L BKFO  5"x10  15"x5 ea /c MHP 5"x15 ea MHP D/C -      H/L TA alt UE  5"x10 ea  3"x15ea /c MHP   -      H/L TA alt LE  5"x10 ea  3"x15ea /c MHP   -      H/L TA alt UE/LE      5"x10 ea 5"x10 ea 5"x10 ea                  UT stretch B  30"x3ea  30"x3ea 30"x3 ea 30"x3 ea 30"x3 ea 30"x3 ea     Lev scap stretch B  30"x3ea  30"x3ea 30"x3 ea 30"x3 ea 30"x3 ea 30"x3 ea     Rhomboid stretch  30"x3  30"x3 20"x5 30"x3 ea 20"x5 30"x3 ea     TS seated extension  5"x10  5"x15 10"x10  1/ foam np         Modalities  1/15 1/20 1/23 1/27 1/30 2/3 2/6 2/10     MHP  10' seated 10' seated 10' H/L + TE  x10' pre C/s, l/s 10' pre supine C/s, l/s 10' pre supine

## 2020-02-13 ENCOUNTER — TRANSCRIBE ORDERS (OUTPATIENT)
Dept: ADMINISTRATIVE | Facility: HOSPITAL | Age: 35
End: 2020-02-13

## 2020-02-13 ENCOUNTER — EVALUATION (OUTPATIENT)
Dept: PHYSICAL THERAPY | Facility: CLINIC | Age: 35
End: 2020-02-13
Payer: COMMERCIAL

## 2020-02-13 DIAGNOSIS — M54.50 CHRONIC BILATERAL LOW BACK PAIN WITHOUT SCIATICA: Primary | ICD-10-CM

## 2020-02-13 DIAGNOSIS — M54.2 CERVICALGIA: ICD-10-CM

## 2020-02-13 DIAGNOSIS — R94.5 NONSPECIFIC ABNORMAL RESULTS OF LIVER FUNCTION STUDY: ICD-10-CM

## 2020-02-13 DIAGNOSIS — E78.5 HYPERLIPIDEMIA, UNSPECIFIED HYPERLIPIDEMIA TYPE: Primary | ICD-10-CM

## 2020-02-13 DIAGNOSIS — G89.29 CHRONIC BILATERAL LOW BACK PAIN WITHOUT SCIATICA: Primary | ICD-10-CM

## 2020-02-13 PROCEDURE — 97110 THERAPEUTIC EXERCISES: CPT | Performed by: PHYSICAL THERAPIST

## 2020-02-13 PROCEDURE — 97112 NEUROMUSCULAR REEDUCATION: CPT | Performed by: PHYSICAL THERAPIST

## 2020-02-13 PROCEDURE — 97140 MANUAL THERAPY 1/> REGIONS: CPT | Performed by: PHYSICAL THERAPIST

## 2020-02-13 NOTE — PROGRESS NOTES
PT Re-Evaluation     Today's date: 2020  Patient name: Scot Wade  : 1985  MRN: [id number]  Referring provider: Ming Jennings MD  Dx:   Encounter Diagnosis     ICD-10-CM    1  Chronic bilateral low back pain without sciatica M54 5     G89 29    2  Cervicalgia M54 2                   Assessment  Assessment details: Scot Wade has been treated in outpatient physical therapy over the past 4 weeks for diagnosis/complaints of Chronic bilateral low back pain without sciatica  (primary encounter diagnosis)  Cervicalgia  Pt demonstrates increased range of motion, improved strength, decreased pain, and increased activity tolerance  Pt continues to present with pain, decreased range of motion, decreased strength, and decreased tolerance to activity  Pt would continue to benefit from skilled physical therapy to address limitations and to achieve goals  Thank you for this referral   Impairments: abnormal coordination, abnormal muscle firing, abnormal muscle tone, abnormal or restricted ROM, abnormal movement, activity intolerance, impaired physical strength, pain with function and poor posture   Understanding of Dx/Px/POC: good   Prognosis: good    Goals  ST  Patient will report 25% decrease in pain in 4 weeks  - MET  2  Patient will demonstrate 25% improvement in ROM in 4 weeks  - MET  3  Patient will demonstrate 1/2 grade improvement in strength in 4 weeks  - MET    LT  Patient will be able to perform IADLS without restriction or pain by discharge  2  Patient will be independent in HEP by discharge  3  Patient will be able to return to recreational/work duties without restriction or pain by discharge        Plan  Patient would benefit from: PT eval and skilled PT  Planned modality interventions: cryotherapy and thermotherapy: hydrocollator packs  Planned therapy interventions: IADL retraining, body mechanics training, flexibility, functional ROM exercises, home exercise program, neuromuscular re-education, manual therapy, postural training, strengthening, stretching, therapeutic activities, therapeutic exercise, joint mobilization, motor coordination training, activity modification, self care, patient education and abdominal trunk stabilization  Frequency: 2x week  Duration in visits: 8  Duration in weeks: 4  Plan of Care beginning date: 2/13/2020  Plan of Care expiration date: 3/12/2020  Treatment plan discussed with: patient        Subjective Evaluation    History of Present Illness  Mechanism of injury: 1/15: Communication with patient completed via hospital provided telephone Ukrainian<>English interpretation services throughout evaluation  Pt reports several year history of low back pain, attributed to accident in [place] as well as work injury in [address]  However, pt reports symptoms has been worsening over the past several years  Reports he has consulted many specialists for low back pain, with several diagnostic scans performed  Notes he had therapy several years ago for hand pain  AGGS: sitting straight, lying supine >30 minutes, bending forward, household chores (using broom)  LOC: central lumbar region, with radiating symptoms to buttocks and bilat posterior LEs  States pain quality changes, sometimes aching, sharp, stabbing, burning, locking  States he experiences swelling in LEs  States he feels the pain is changing, now moving form low back to abdominal region  Reports he has many diagnostics scans to rule out other pathologic conditions  Unable to report if paresthesias or dysesthesias are present  EASES: none long lasting; has trialed heat, ice, massage  1/23: Pt reports chronic history of cervcial spine pain  Reports symptoms are constant , however, worse with any movement of neck  AGGS: movement of neck  LOC: central cervical spine, radiating to occipital region, as well as bilateral shoulders  Describes symptoms as sharp, as well as tingling    EASES: heat, medications  Reports multiple injections in bilateral arms, shoulder, neck, and low back, however, denies long-lasting relief  Notes he is not taking prescribed medications, however, was prescribed new med as of yesterday  Follows up with physician in 6 weeks  : Pt reports he feels "a little better" in neck and back, however, notes he continues to have pain with most IADLS  States he has follow up with referring physician 3/18  Pain  Current pain ratin  At best pain ratin  At worst pain rating: 10    Patient Goals  Patient goals for therapy: decreased pain and increased motion          Objective     Concurrent Complaints  Negative for bladder dysfunction, bowel dysfunction, history of trauma and infection    Palpation   Left   No palpable tenderness to the cervical paraspinals  Tenderness of the erector spinae and upper trapezius  Right   No palpable tenderness to the cervical paraspinals  Tenderness of the erector spinae and upper trapezius       Neurological Testing     Sensation     Lumbar   Left   Diminished: light touch    Right   Intact: light touch    Reflexes   Left   Patellar (L4): trace (1+)  Achilles (S1): trace (1+)  Clonus sign: negative    Right   Patellar (L4): trace (1+)  Achilles (S1): trace (1+)  Clonus sign: negative    Additional Neurological Details  1/15:  LOWER EXTREMITY MYOTOMES: Gross reduction in LE strength due to pain  LOWER EXTREMITY DERMATOMES: Decreased sensation reported left L3, L5, S2    Active Range of Motion   Cervical/Thoracic Spine       Cervical    Flexion: 60 degrees  with pain  Extension: 25 degrees     with pain  Left lateral flexion: 35 degrees     with pain  Right lateral flexion: 35 degrees     with pain  Left rotation: 50 degrees with pain  Right rotation: 60 degrees    with pain    Lumbar   Flexion: 60 degrees  with pain  Extension: 25 degrees  with pain  Left lateral flexion: 30 degrees    with pain  Right lateral flexion: 30 degrees  with pain  Left rotation:  Restriction level: minimal  Right rotation:  Restriction level: minimal    Additional Active Range of Motion Details  CERVICAL AROM -  Flexion, extension, bilat lateral flexion measured with bubble inclinometer on crown; Rotation measured with goniometer and patient seated      Joint Play     Hypomobile: C2, C3, C4, C5, C6, C7, T1, T2, T3, T4, T5, T6, T7, T8, L1, L2, L3, L4 and L5     Pain: C5, C6, C7, T1, T2, T3, T4, T5, L4 and L5     Strength/Myotome Testing     Left Shoulder     Planes of Motion   Flexion: 4-   Abduction: 4-   External rotation at 0°: 4-   Internal rotation at 0°: 4-     Right Shoulder     Planes of Motion   Flexion: 4-   Abduction: 4-   External rotation at 0°: 4-   Internal rotation at 0°: 4-     Left Hip   Planes of Motion   Flexion: 4  Extension: 4  Abduction: 4    Right Hip   Planes of Motion   Flexion: 4  Extension: 4  Abduction: 4    Left Knee   Flexion: 4  Extension: 4    Right Knee   Flexion: 4  Extension: 4    Left Ankle/Foot   Dorsiflexion: 4  Plantar flexion: 4  Great toe extension: 4+    Right Ankle/Foot   Dorsiflexion: 4  Plantar flexion: 4  Great toe extension: 4+    General Comments:      Hip Comments   1/15: Pain reported with SLR ~50*, passive hip flexion to ~80*; denies pain with passive IR/ER    2/13: WFL SLR bilat to ~90*, passive hip flex >90* bilat      Flowsheet Rows      Most Recent Value   PT/OT G-Codes   Current Score  48   Projected Score  50             Precautions: n/a    Date 1/15 1/20 1/23 1/27 1/30 2/3 2/6 2/10 2/13    FOTO IE  CS      CS 48  LS 36    Re-eval IE  CINTHYA-CS      CINTHYA      Manual  1/15 1/20 1/23 1/27 1/30 2/3 2/6 2/10 2/13    TPR to c-spine       [address]      SOR       [address]  CINTHYA      Exercise Diary  1/15 1/20 1/23 1/27 1/30 2/3 2/6 2/10 2/13    bike  5 min  L1 7'  L1 5' L1 5'        Lumbar pball roll outs  15"x5  15"x5ea 10"x10 ea 10"x5 ea 10"x5 ea 10"x5 ea 15"x5 ea                 LTR  15"x5 ea  15"x5 ea /c MHP 10"x10 ea MHP 15"x5 ea 10"x10 MHP 15"x5 ea 15"x5 ea    SKTC  15"x5 ea  15"x5 ea /c MHP 20"x5 ea MHP 15"x5 ea 20"x5 ea 20"x5 ea 15"x5 ea    H/L TA   5"x10  5"x10 /c MHP 5"x20 MHP 5"x20 5"x20 5"x20 D/C    H/L BKFO  5"x10  15"x5 ea /c MHP 5"x15 ea MHP D/C -      H/L TA alt UE  5"x10 ea  3"x15ea /c MHP   -      H/L TA alt LE  5"x10 ea  3"x15ea /c MHP   -      H/L TA alt UE/LE      5"x10 ea 5"x10 ea 5"x10 ea 5"x15 ea                 UT stretch B  30"x3ea  30"x3ea 30"x3 ea 30"x3 ea 30"x3 ea 30"x3 ea 30"x3 ea    Lev scap stretch B  30"x3ea  30"x3ea 30"x3 ea 30"x3 ea 30"x3 ea 30"x3 ea 30"x3 ea    Rhomboid stretch  30"x3  30"x3 20"x5 30"x3 ea 20"x5 30"x3 ea 30"x3 ea    TS seated extension  5"x10  5"x15 10"x10  1/2 foam np         Modalities  1/15 1/20 1/23 1/27 1/30 2/3 2/6 2/10 2/13    MHP  10' seated 10' seated 10' H/L + TE  x10' pre C/s, l/s 10' pre supine C/s, l/s 10' pre supine C/s, l/s 10' pre supine

## 2020-02-17 ENCOUNTER — OFFICE VISIT (OUTPATIENT)
Dept: PHYSICAL THERAPY | Facility: CLINIC | Age: 35
End: 2020-02-17
Payer: COMMERCIAL

## 2020-02-17 DIAGNOSIS — M54.2 CERVICALGIA: ICD-10-CM

## 2020-02-17 DIAGNOSIS — G89.29 CHRONIC BILATERAL LOW BACK PAIN WITHOUT SCIATICA: Primary | ICD-10-CM

## 2020-02-17 DIAGNOSIS — M54.50 CHRONIC BILATERAL LOW BACK PAIN WITHOUT SCIATICA: Primary | ICD-10-CM

## 2020-02-17 PROCEDURE — 97140 MANUAL THERAPY 1/> REGIONS: CPT

## 2020-02-17 PROCEDURE — 97110 THERAPEUTIC EXERCISES: CPT

## 2020-02-17 PROCEDURE — 97112 NEUROMUSCULAR REEDUCATION: CPT

## 2020-02-17 NOTE — PROGRESS NOTES
Daily Note     Today's date: 2020  Patient name: Fatoumata Long  : 1985  MRN: [id number]  Referring provider: Shelia Olmedo MD  Dx:   Encounter Diagnosis     ICD-10-CM    1  Chronic bilateral low back pain without sciatica M54 5     G89 29    2  Cervicalgia M54 2                   Subjective: Pt reports R UT and c-spine pain today upon arrival  He also notes continued b/l LBP without radiculopathy      Objective: See treatment diary below     Precautions: n/a    Date 1/15 1/20 1/23 1/27 1/30 2/3 2/6 2/10 2/13 2/17   FOTO IE  CS      CS 48  LS 36    Re-eval IE  CINTHYA-CS      CINTHYA      Manual  1/15 1/20 1/23 1/27 1/30 2/3 2/6 2/10 2/13 2/17   TPR to c-spine       [facility]     Exercise Diary  1/15 1/20 1/23 1/27 1/30 2/3 2/6 2/10 2/13 2/17   bike  5 min  L1 7'  L1 5' L1 5'     L1 8'   Lumbar pball roll outs 3-way  15"x5  15"x5ea 10"x10 ea 10"x5 ea 10"x5 ea 10"x5 ea 15"x5 ea 10"x10 ea                LTR  15"x5 ea  15"x5 ea /c MHP 10"x10 ea MHP 15"x5 ea 10"x10 MHP 15"x5 ea 15"x5 ea HEP   SKTC  15"x5 ea  15"x5 ea /c MHP 20"x5 ea MHP 15"x5 ea 20"x5 ea 20"x5 ea 15"x5 ea HEP   Alt TB ext on pball          GTB 5"x20 ea grn ball   Multifidus press on pball          GTB 5"x20 ea grn ball   pball marching          15x ea grn ball                H/L TA alt UE/LE      5"x10 ea 5"x10 ea 5"x10 ea 5"x15 ea 5"x15 ea   Chin tucks supine             UT stretch B  30"x3ea  30"x3ea 30"x3 ea 30"x3 ea 30"x3 ea 30"x3 ea 30"x3 ea 30"x3 to L   Lev scap stretch B  30"x3ea  30"x3ea 30"x3 ea 30"x3 ea 30"x3 ea 30"x3 ea 30"x3 ea 30"x3 to L   Rhomboid stretch  30"x3  30"x3 20"x5 30"x3 ea 20"x5 30"x3 ea 30"x3 ea 30"x3   TS seated extension  5"x10  5"x15 10"x10  1/2 foam np    10"x10 1/2 foam     Modalities  1/15 1/20 1/23 1/27 1/30 2/3 2/6 2/10 2/13 2/17   MHP  10' seated 10' seated 10' H/L + TE  x10' pre C/s, l/s 10' pre supine C/s, l/s 10' pre supine C/s, l/s 10' pre supine C/s, l/s 10' post TE, pre- manuals supine Assessment: Tolerated stabilization additions to treatment well  Patient demonstrated fatigue post treatment, exhibited good technique with therapeutic exercises and would benefit from continued PT to improve pain-free mobility and core and lumbar stabilization  Plan: Continue per plan of care  Progress treatment as tolerated      Jax Cavazos, CHELA

## 2020-02-20 ENCOUNTER — OFFICE VISIT (OUTPATIENT)
Dept: PHYSICAL THERAPY | Facility: CLINIC | Age: 35
End: 2020-02-20
Payer: COMMERCIAL

## 2020-02-20 DIAGNOSIS — M54.2 CERVICALGIA: Primary | ICD-10-CM

## 2020-02-20 DIAGNOSIS — M54.50 CHRONIC BILATERAL LOW BACK PAIN WITHOUT SCIATICA: ICD-10-CM

## 2020-02-20 DIAGNOSIS — G89.29 CHRONIC BILATERAL LOW BACK PAIN WITHOUT SCIATICA: ICD-10-CM

## 2020-02-20 PROCEDURE — 97110 THERAPEUTIC EXERCISES: CPT | Performed by: PHYSICAL THERAPIST

## 2020-02-20 PROCEDURE — 97112 NEUROMUSCULAR REEDUCATION: CPT | Performed by: PHYSICAL THERAPIST

## 2020-02-20 PROCEDURE — 97530 THERAPEUTIC ACTIVITIES: CPT | Performed by: PHYSICAL THERAPIST

## 2020-02-20 NOTE — PROGRESS NOTES
Daily Note     Today's date: 2020  Patient name: Garrison Sinclair  : 1985  MRN: [id number]  Referring provider: Carolyn Mora MD  Dx:   Encounter Diagnosis     ICD-10-CM    1  Cervicalgia M54 2    2  Chronic bilateral low back pain without sciatica M54 5     G89 29                   Subjective: Pt comes to therapy reporting he is feeling better, however, reports continued pain in neck and low back  Objective: See treatment diary below      Assessment: Tolerated treatment well  Patient demonstrated fatigue post treatment, exhibited good technique with therapeutic exercises and would benefit from continued PT      Plan: Progress treatment as tolerated         Precautions: n/a    Date  2/3 2/6 2/10 2/13 2/17   FOTO   CS      CS 48  LS 36    Re-eval   CINTHYA-CS      CINTHYA      Manual   2/3 2/6 2/10 2/13 2/17   TPR to c-spine       SH   SH   SOR       SH  CINTHYA [address]     Exercise Diary   2/3 2/6 2/10 2/13 2/17   bike 5 min 5 min  L1 7'  L1 5' L1 5'     L1 8'   Lumbar pball roll outs 3-way 15"x5 ea 15"x5  15"x5ea 10"x10 ea 10"x5 ea 10"x5 ea 10"x5 ea 15"x5 ea 10"x10 ea                LTR 15"x5 ea 15"x5 ea  15"x5 ea /c MHP 10"x10 ea MHP 15"x5 ea 10"x10 MHP 15"x5 ea 15"x5 ea HEP   SKTC 15"x5 ea 15"x5 ea  15"x5 ea /c MHP 20"x5 ea MHP 15"x5 ea 20"x5 ea 20"x5 ea 15"x5 ea HEP   Alt TB ext on pball BTB 5"x15 ea grn ball         GTB 5"x20 ea grn ball   Multifidus press on pball missed         GTB 5"x20 ea grn ball   pball marching 15x ea grn ball         15x ea grn ball                H/L TA alt UE/LE 5"x20 ea     5"x10 ea 5"x10 ea 5"x10 ea 5"x15 ea 5"x15 ea   Chin tucks supine             UT stretch B 30"x3 ea 30"x3ea  30"x3ea 30"x3 ea 30"x3 ea 30"x3 ea 30"x3 ea 30"x3 ea 30"x3 to L   Lev scap stretch B 30"x3 ea 30"x3ea  30"x3ea 30"x3 ea 30"x3 ea 30"x3 ea 30"x3 ea 30"x3 ea 30"x3 to L   Rhomboid stretch 30"x3 30"x3  30"x3 20"x5 30"x3 ea 20"x5 30"x3 ea 30"x3 ea 30"x3   TS seated extension    5"x10  5"x15 10"x10  1/2 foam np    10"x10 1/2 foam     Modalities  2/20 1/20 1/23 1/27 1/30 2/3 2/6 2/10 2/13 2/17   MHP  10' seated 10' seated 10' H/L + TE  x10' pre C/s, l/s 10' pre supine C/s, l/s 10' pre supine C/s, l/s 10' pre supine C/s, l/s 10' post TE, pre- manuals supine

## 2020-02-24 ENCOUNTER — OFFICE VISIT (OUTPATIENT)
Dept: PHYSICAL THERAPY | Facility: CLINIC | Age: 35
End: 2020-02-24
Payer: COMMERCIAL

## 2020-02-24 DIAGNOSIS — M54.2 CERVICALGIA: Primary | ICD-10-CM

## 2020-02-24 DIAGNOSIS — M54.50 CHRONIC BILATERAL LOW BACK PAIN WITHOUT SCIATICA: ICD-10-CM

## 2020-02-24 DIAGNOSIS — G89.29 CHRONIC BILATERAL LOW BACK PAIN WITHOUT SCIATICA: ICD-10-CM

## 2020-02-24 PROCEDURE — 97112 NEUROMUSCULAR REEDUCATION: CPT

## 2020-02-24 PROCEDURE — 97110 THERAPEUTIC EXERCISES: CPT

## 2020-02-24 NOTE — PROGRESS NOTES
Daily Note     Today's date: 2020  Patient name: Roland Perry  : 1985  MRN: [id number]  Referring provider: Ansley Winters MD  Dx:   Encounter Diagnosis     ICD-10-CM    1  Cervicalgia M54 2    2  Chronic bilateral low back pain without sciatica M54 5     G89 29                   Subjective: Pt reports no change in neck or back pain since LV  He notes decrease in pain throughout and post-session following MHP      Objective: See treatment diary below     Precautions: n/a    Date 2/20 2/24    2/3 2/6 2/10 2/13 2/17   FOTO         CS 48  LS 36    Re-eval         CINTHYA      Manual  2/20 2/24    2/3 2/6 2/10 2/13 2/17   TPR to c-spine       [address]   SH   SOR       [address]  CINTHYA [address]     Exercise Diary  2/20 2/24    2/3 2/6 2/10 2/13 2/17   bike 5 min 8 min    L1 5'     L1 8'   Lumbar pball roll outs 3-way 15"x5 ea 15"x5 ea    10"x5 ea 10"x5 ea 10"x5 ea 15"x5 ea 10"x10 ea                LTR 15"x5 ea 10"x10 c t/s ext    15"x5 ea 10"x10 MHP 15"x5 ea 15"x5 ea HEP   SKTC 15"x5 ea 30"x3     15"x5 ea 20"x5 ea 20"x5 ea 15"x5 ea HEP   Alt TB ext on pball BTB 5"x15 ea grn ball BTB 5"x20 ea grn ball        GTB 5"x20 ea grn ball   Multifidus press on pball missed GTB 5"x15 ea   grn ball        GTB 5"x20 ea grn ball   pball marching 15x ea grn ball 15x ea grn ball        15x ea grn ball                H/L TA alt UE/LE 5"x20 ea 5"x20 ea, w/ knee ext    5"x10 ea 5"x10 ea 5"x10 ea 5"x15 ea 5"x15 ea   Chin tucks supine  5"x20           UT stretch B 30"x3 ea 30"x3 ea    30"x3 ea 30"x3 ea 30"x3 ea 30"x3 ea 30"x3 to L   Lev scap stretch B 30"x3 ea 30"x3 ea    30"x3 ea 30"x3 ea 30"x3 ea 30"x3 ea 30"x3 to L   Rhomboid stretch 30"x3 20"x5    30"x3 ea 20"x5 30"x3 ea 30"x3 ea 30"x3   TS seated extension    Supine 1'+ LTR    np    10"x10  foam     Modalities      2/3 2/6 2/10 2/13 2/17   MHP  Seated 10' to l/s and c/s seated post    x10' pre C/s, l/s 10' pre supine C/s, l/s 10' pre supine C/s, l/s 10' pre supine C/s, l/s 10' post TE, pre- manuals supine       Assessment: Tolerated treatment well  Patient demonstrated fatigue post treatment and exhibited good technique with therapeutic exercises, though pt required cuing for proper rep times and hold times  Plan: Continue per plan of care  Progress treatment as tolerated      Kayla Resendiz, PTA

## 2020-02-25 ENCOUNTER — HOSPITAL ENCOUNTER (OUTPATIENT)
Dept: ULTRASOUND IMAGING | Facility: HOSPITAL | Age: 35
Discharge: HOME | End: 2020-02-25
Payer: COMMERCIAL

## 2020-02-25 DIAGNOSIS — R94.5 NONSPECIFIC ABNORMAL RESULTS OF LIVER FUNCTION STUDY: ICD-10-CM

## 2020-02-25 DIAGNOSIS — E78.5 HYPERLIPIDEMIA, UNSPECIFIED HYPERLIPIDEMIA TYPE: ICD-10-CM

## 2020-02-25 PROCEDURE — 76700 US EXAM ABDOM COMPLETE: CPT

## 2020-02-27 ENCOUNTER — OFFICE VISIT (OUTPATIENT)
Dept: PHYSICAL THERAPY | Facility: CLINIC | Age: 35
End: 2020-02-27
Payer: COMMERCIAL

## 2020-02-27 DIAGNOSIS — M54.50 CHRONIC BILATERAL LOW BACK PAIN WITHOUT SCIATICA: ICD-10-CM

## 2020-02-27 DIAGNOSIS — G89.29 CHRONIC BILATERAL LOW BACK PAIN WITHOUT SCIATICA: ICD-10-CM

## 2020-02-27 DIAGNOSIS — M54.2 CERVICALGIA: Primary | ICD-10-CM

## 2020-02-27 PROCEDURE — 97110 THERAPEUTIC EXERCISES: CPT

## 2020-02-27 PROCEDURE — 97140 MANUAL THERAPY 1/> REGIONS: CPT

## 2020-02-27 PROCEDURE — 97150 GROUP THERAPEUTIC PROCEDURES: CPT

## 2020-02-27 NOTE — PROGRESS NOTES
Daily Note     Today's date: 2020  Patient name: Talisha De Jesus  : 1985  MRN: [id number]  Referring provider: Ansley Winters MD  Dx:   Encounter Diagnosis     ICD-10-CM    1  Cervicalgia M54 2    2  Chronic bilateral low back pain without sciatica M54 5     G89 29                     Subjective: Pt reports continued neck and lower back pain, but better since beginning therapy  Objective: See treatment diary below      Assessment: Tolerated treatment well  No complaints iwith majority of exercises except multifidus pressouts  He reported shoulder pain while doing so, therefore discontinued  Patient exhibited good technique with therapeutic exercises and would benefit from continued PT  Plan: Continue per plan of care  Progress treatment as tolerated         Precautions: n/a    Date 2/20 2/24 2/27   2/3 2/6 2/10 2/13 2/17   FOTO         CS 48  LS 36    Re-eval         CINTHYA      Manual  2/20 2/24 2/27   2/3 2/6 2/10 2/13 2/17   TPR to c-spine   TE    [address]   SH   SOR   TE    SH  CINTHYA [address]     Exercise Diary  2/20 2/24 2/27   2/3 2/6 2/10 2/13 2/17   bike 5 min 8 min 8 min   L1 5'     L1 8'   Lumbar pball roll outs 3-way 15"x5 ea 15"x5 ea 15"x5 ea   10"x5 ea 10"x5 ea 10"x5 ea 15"x5 ea 10"x10 ea                LTR 15"x5 ea 10"x10 c t/s ext np   15"x5 ea 10"x10 MHP 15"x5 ea 15"x5 ea HEP   SKTC 15"x5 ea 30"x3  np   15"x5 ea 20"x5 ea 20"x5 ea 15"x5 ea HEP   Alt TB ext on pball BTB 5"x15 ea grn ball BTB 5"x20 ea grn ball btb 5"x20 ea stand       GTB 5"x20 ea grn ball   Multifidus press on pball missed GTB 5"x15 ea   grn ball btb 5"x20 stand       GTB 5"x20 ea grn ball   pball marching 15x ea grn ball 15x ea grn ball 20 ea grn pball       15x ea grn ball                H/L TA alt UE/LE 5"x20 ea 5"x20 ea, w/ knee ext 5"x20 ea,   5"x10 ea 5"x10 ea 5"x10 ea 5"x15 ea 5"x15 ea   Chin tucks supine  5"x20 5"x20 ea,          UT stretch B 30"x3 ea 30"x3 ea 30"x3 ea   30"x3 ea 30"x3 ea 30"x3 ea 30"x3 ea 30"x3 to L Lev scap stretch B 30"x3 ea 30"x3 ea 30"x3 ea   30"x3 ea 30"x3 ea 30"x3 ea 30"x3 ea 30"x3 to L   Rhomboid stretch 30"x3 20"x5 30"x3 ea   30"x3 ea 20"x5 30"x3 ea 30"x3 ea 30"x3   TS seated extension    Supine 1'+ LTR    np    10"x10 1/2 foam     Modalities  2/20 2/24 2/27   2/3 2/6 2/10 2/13 2/17   MHP  Seated 10' to l/s and c/s seated post np   x10' pre C/s, l/s 10' pre supine C/s, l/s 10' pre supine C/s, l/s 10' pre supine C/s, l/s 10' post TE, pre- manuals supine

## 2020-03-02 ENCOUNTER — OFFICE VISIT (OUTPATIENT)
Dept: PHYSICAL THERAPY | Facility: CLINIC | Age: 35
End: 2020-03-02
Payer: COMMERCIAL

## 2020-03-02 DIAGNOSIS — G89.29 CHRONIC BILATERAL LOW BACK PAIN WITHOUT SCIATICA: ICD-10-CM

## 2020-03-02 DIAGNOSIS — M54.2 CERVICALGIA: Primary | ICD-10-CM

## 2020-03-02 DIAGNOSIS — M54.50 CHRONIC BILATERAL LOW BACK PAIN WITHOUT SCIATICA: ICD-10-CM

## 2020-03-02 PROCEDURE — 97112 NEUROMUSCULAR REEDUCATION: CPT | Performed by: PHYSICAL THERAPIST

## 2020-03-02 PROCEDURE — 97110 THERAPEUTIC EXERCISES: CPT | Performed by: PHYSICAL THERAPIST

## 2020-03-02 PROCEDURE — 97530 THERAPEUTIC ACTIVITIES: CPT | Performed by: PHYSICAL THERAPIST

## 2020-03-02 NOTE — PROGRESS NOTES
Daily Note     Today's date: 3/2/2020  Patient name: Jennyfer Martinez  : 1985  MRN: [id number]  Referring provider: Jim Bee MD  Dx:   Encounter Diagnosis     ICD-10-CM    1  Cervicalgia M54 2    2  Chronic bilateral low back pain without sciatica M54 5     G89 29                   Subjective: Pt comes to therapy reporting continued cervical spine pain discomfort and reporting discomfort in posterior-lateral right buttock over the weekend, described as "like having an injection " States he attempted shampooing carpets this past weekend, however, had to stop due to pain in cervical-shoulder region  Objective: See treatment diary below      Assessment: Tolerated treatment well  Patient exhibited good technique with therapeutic exercises and would benefit from continued PT      Plan: Progress treatment as tolerated         Precautions: n/a    Date  32  2/3 2/6 2/10 2/13 2/17   FOTO         CS 48  LS 36    Re-eval         CINTHYA      Manual   3/2  2/3 2/6 2/10 2/13 2/17   TPR to c-spine   TE    [address]   SH   SOR   TE    SH  CINTHYA [address]     Exercise Diary   3/2  2/3 2/6 2/10 2/13 2/17   bike 5 min 8 min 8 min 8'  L1 5'     L1 8'   Lumbar pball roll outs 3-way 15"x5 ea 15"x5 ea 15"x5 ea 15"x5 ea  10"x5 ea 10"x5 ea 10"x5 ea 15"x5 ea 10"x10 ea                LTR 15"x5 ea 10"x10 c t/s ext np np  15"x5 ea 10"x10 MHP 15"x5 ea 15"x5 ea HEP   SKTC 15"x5 ea 30"x3  np 15"x5 ea  15"x5 ea 20"x5 ea 20"x5 ea 15"x5 ea HEP   Alt TB ext on pball BTB 5"x15 ea grn ball BTB 5"x20 ea grn ball btb 5"x20 ea stand btb 5" x20 ea pball      GTB 5"x20 ea grn ball   Multifidus press on pball missed GTB 5"x15 ea   grn ball btb 5"x20 stand btb 5"x20 pball      GTB 5"x20 ea grn ball   pball marching 15x ea grn ball 15x ea grn ball 20 ea grn pball 20 ea blue pball      15x ea grn ball                Bridge     5"x20         H/L TA alt UE/LE 5"x20 ea 5"x20 ea, w/ knee ext 5"x20 ea, 5"x20 ea,  5"x10 ea 5"x10 ea 5"x10 ea 5"x15 ea 5"x15 ea   Chin tucks supine  5"x20 5"x20 ea, np         UT stretch B 30"x3 ea 30"x3 ea 30"x3 ea 30"x3 ea  30"x3 ea 30"x3 ea 30"x3 ea 30"x3 ea 30"x3 to L   Lev scap stretch B 30"x3 ea 30"x3 ea 30"x3 ea 30"x3 ea  30"x3 ea 30"x3 ea 30"x3 ea 30"x3 ea 30"x3 to L   Rhomboid stretch 30"x3 20"x5 30"x3 ea 30"x3 ea  30"x3 ea 20"x5 30"x3 ea 30"x3 ea 30"x3   TS seated extension    Supine 1'+ LTR    np    10"x10 1/2 foam     Modalities  2/20 2/24 2/27 3/2  2/3 2/6 2/10 2/13 2/17   MHP  Seated 10' to l/s and c/s seated post np Seated 10' to C/S post  x10' pre C/s, l/s 10' pre supine C/s, l/s 10' pre supine C/s, l/s 10' pre supine C/s, l/s 10' post TE, pre- manuals supine

## 2020-03-05 ENCOUNTER — EVALUATION (OUTPATIENT)
Dept: PHYSICAL THERAPY | Facility: CLINIC | Age: 35
End: 2020-03-05
Payer: COMMERCIAL

## 2020-03-05 DIAGNOSIS — M54.2 CERVICALGIA: Primary | ICD-10-CM

## 2020-03-05 DIAGNOSIS — G89.29 CHRONIC BILATERAL LOW BACK PAIN WITHOUT SCIATICA: ICD-10-CM

## 2020-03-05 DIAGNOSIS — M54.50 CHRONIC BILATERAL LOW BACK PAIN WITHOUT SCIATICA: ICD-10-CM

## 2020-03-05 PROCEDURE — 97110 THERAPEUTIC EXERCISES: CPT | Performed by: PHYSICAL THERAPIST

## 2020-03-05 NOTE — PROGRESS NOTES
PT Re-Evaluation     Today's date: 3/5/2020  Patient name: Edwige Hart  : 1985  MRN: [id number]  Referring provider: Shayne Bowman MD  Dx:   Encounter Diagnosis     ICD-10-CM    1  Cervicalgia M54 2    2  Chronic bilateral low back pain without sciatica M54 5     G89 29                   Assessment  Assessment details: Edwige Hart has been treated in outpatient physical therapy over the past 4 weeks for diagnosis/complaints of Chronic bilateral low back pain without sciatica  (primary encounter diagnosis)  Cervicalgia  Pt demonstrates no changes in cervical or lumbar range of motion, strength levels, pain levels, or activity tolerance  Pt demonstrates minimal to no changes over the past several weeks, appearing to have reached plateau and failure to demonstrate improvements in limitations, impairments, and to achieve goals with therapy  Patient advised to follow up with referring physician for further advisement on alternate treatment options or further diagnostic workup  Physical therapy to be discontinued at present time until further evaluation completed by referring physician  Impairments: abnormal coordination, abnormal muscle firing, abnormal muscle tone, abnormal or restricted ROM, abnormal movement, activity intolerance, impaired physical strength, pain with function and poor posture   Understanding of Dx/Px/POC: good   Prognosis: good    Goals  ST  Patient will report 25% decrease in pain in 4 weeks  - NOT MET  2  Patient will demonstrate 25% improvement in ROM in 4 weeks  - NOT MET  3  Patient will demonstrate 1/2 grade improvement in strength in 4 weeks  - NOT MET    LT  Patient will be able to perform IADLS without restriction or pain by discharge  - NOT MET  2  Patient will be independent in HEP by discharge  - NOT MET  3   Patient will be able to return to recreational/work duties without restriction or pain by discharge  - NOT MET      Plan  Patient would benefit from: PT eval and skilled PT  Planned modality interventions: cryotherapy and thermotherapy: hydrocollator packs  Planned therapy interventions: IADL retraining, body mechanics training, flexibility, functional ROM exercises, home exercise program, neuromuscular re-education, manual therapy, postural training, strengthening, stretching, therapeutic activities, therapeutic exercise, joint mobilization, motor coordination training, activity modification, self care, patient education and abdominal trunk stabilization  Frequency: 2x week  Duration in visits: 8  Duration in weeks: 4  Plan of Care beginning date: 2/13/2020  Plan of Care expiration date: 3/12/2020  Treatment plan discussed with: patient        Subjective Evaluation    History of Present Illness  Mechanism of injury: 1/15: Communication with patient completed via hospital provided telephone Montenegrin<>English interpretation services throughout evaluation  Pt reports several year history of low back pain, attributed to accident in [place] as well as work injury in [employer]  However, pt reports symptoms has been worsening over the past several years  Reports he has consulted many specialists for low back pain, with several diagnostic scans performed  Notes he had therapy several years ago for hand pain  AGGS: sitting straight, lying supine >30 minutes, bending forward, household chores (using broom)  LOC: central lumbar region, with radiating symptoms to buttocks and bilat posterior LEs  States pain quality changes, sometimes aching, sharp, stabbing, burning, locking  States he experiences swelling in LEs  States he feels the pain is changing, now moving form low back to abdominal region  Reports he has many diagnostics scans to rule out other pathologic conditions  Unable to report if paresthesias or dysesthesias are present  EASES: none long lasting; has trialed heat, ice, massage  1/23: Pt reports chronic history of cervcial spine pain   Reports symptoms are constant , however, worse with any movement of neck  AGGS: movement of neck  LOC: central cervical spine, radiating to occipital region, as well as bilateral shoulders  Describes symptoms as sharp, as well as tingling  EASES: heat, medications  Reports multiple injections in bilateral arms, shoulder, neck, and low back, however, denies long-lasting relief  Notes he is not taking prescribed medications, however, was prescribed new med as of yesterday  Follows up with physician in 6 weeks  : Pt reports he feels "a little better" in neck and back, however, notes he continues to have pain with most IADLS  States he has follow up with referring physician 3/18     3/5: Pt reports no changes in low back pain   Pain  Current pain ratin  At best pain ratin  At worst pain rating: 10    Patient Goals  Patient goals for therapy: decreased pain and increased motion          Objective     Concurrent Complaints  Negative for bladder dysfunction, bowel dysfunction, history of trauma and infection    Palpation   Left   No palpable tenderness to the cervical paraspinals  Tenderness of the erector spinae and upper trapezius  Right   No palpable tenderness to the cervical paraspinals  Tenderness of the erector spinae and upper trapezius       Neurological Testing     Sensation     Lumbar   Left   Diminished: light touch    Right   Intact: light touch    Reflexes   Left   Patellar (L4): trace (1+)  Achilles (S1): trace (1+)  Clonus sign: negative    Right   Patellar (L4): trace (1+)  Achilles (S1): trace (1+)  Clonus sign: negative    Additional Neurological Details  1/15:  LOWER EXTREMITY MYOTOMES: Gross reduction in LE strength due to pain  LOWER EXTREMITY DERMATOMES: Decreased sensation reported left L3, L5, S2    Active Range of Motion   Cervical/Thoracic Spine       Cervical    Flexion: 55 degrees  with pain  Extension: 25 degrees     with pain  Left lateral flexion: 35 degrees     with pain  Right lateral flexion: 35 degrees     with pain  Left rotation: 50 degrees with pain  Right rotation: 60 degrees    with pain    Lumbar   Flexion: 60 degrees  with pain  Extension: 25 degrees  with pain  Left lateral flexion: 30 degrees    with pain  Right lateral flexion: 30 degrees  with pain  Left rotation:  Restriction level: minimal  Right rotation:  Restriction level: minimal    Additional Active Range of Motion Details  CERVICAL AROM -  Flexion, extension, bilat lateral flexion measured with bubble inclinometer on crown; Rotation measured with goniometer and patient seated      Joint Play     Hypomobile: C2, C3, C4, C5, C6, C7, T1, T2, T3, T4, T5, T6, T7, T8, L1, L2, L3, L4 and L5     Pain: C5, C6, C7, T1, T2, T3, T4, T5, L4 and L5     Strength/Myotome Testing     Left Shoulder     Planes of Motion   Flexion: 3+   Abduction: 4-   External rotation at 0°: 4-   Internal rotation at 0°: 4-     Right Shoulder     Planes of Motion   Flexion: 3+   Abduction: 4-   External rotation at 0°: 4-   Internal rotation at 0°: 4-     Left Hip   Planes of Motion   Flexion: 4  Extension: 4  Abduction: 4    Right Hip   Planes of Motion   Flexion: 4  Extension: 4  Abduction: 4    Left Knee   Flexion: 4  Extension: 4    Right Knee   Flexion: 4  Extension: 4    Left Ankle/Foot   Dorsiflexion: 4  Plantar flexion: 4  Great toe extension: 4+    Right Ankle/Foot   Dorsiflexion: 4  Plantar flexion: 4  Great toe extension: 4+    Tests     Lumbar     Left   Negative passive SLR  Right   Negative passive SLR       General Comments:      Hip Comments   1/15: Pain reported with SLR ~50*, passive hip flexion to ~80*; denies pain with passive IR/ER    2/13: WFL SLR bilat to ~90*, passive hip flex >90* bilat    3/5: SLR < 90* bilat, passive hip flex ~90* bilat             Precautions: n/a    Date 2/20 2/24 2/27 3/2 3/5 2/3 2/6 2/10 2/13 2/17   FOTO         CS 48  LS 36    Re-eval         CINTHYA      Manual  2/20 2/24 2/27 3/2 3/5 2/3 2/6 2/10 2/13 2/17   TPR to c-spine   TE    [address]   SH   SOR   TE    [address]  CINTHYA [address]     Exercise Diary  2/20 2/24 2/27 3/2 3/5 2/3 2/6 2/10 2/13 2/17   bike 5 min 8 min 8 min 8' 8' L1 5'     L1 8'   Lumbar pball roll outs 3-way 15"x5 ea 15"x5 ea 15"x5 ea 15"x5 ea  10"x5 ea 10"x5 ea 10"x5 ea 15"x5 ea 10"x10 ea                LTR 15"x5 ea 10"x10 c t/s ext np np 15"x5 ea 15"x5 ea 10"x10 MHP 15"x5 ea 15"x5 ea HEP   SKTC 15"x5 ea 30"x3  np 15"x5 ea 15"x5 ea 15"x5 ea 20"x5 ea 20"x5 ea 15"x5 ea HEP   Alt TB ext on pball BTB 5"x15 ea grn ball BTB 5"x20 ea grn ball btb 5"x20 ea stand btb 5" x20 ea pball      GTB 5"x20 ea grn ball   Multifidus press on pball missed GTB 5"x15 ea   grn ball btb 5"x20 stand btb 5"x20 pball      GTB 5"x20 ea grn ball   pball marching 15x ea grn ball 15x ea grn ball 20 ea grn pball 20 ea blue pball      15x ea grn ball                Bridge     5"x20         H/L TA alt UE/LE 5"x20 ea 5"x20 ea, w/ knee ext 5"x20 ea, 5"x20 ea,  5"x10 ea 5"x10 ea 5"x10 ea 5"x15 ea 5"x15 ea   Chin tucks supine  5"x20 5"x20 ea, np         UT stretch B 30"x3 ea 30"x3 ea 30"x3 ea 30"x3 ea  30"x3 ea 30"x3 ea 30"x3 ea 30"x3 ea 30"x3 to L   Lev scap stretch B 30"x3 ea 30"x3 ea 30"x3 ea 30"x3 ea  30"x3 ea 30"x3 ea 30"x3 ea 30"x3 ea 30"x3 to L   Rhomboid stretch 30"x3 20"x5 30"x3 ea 30"x3 ea  30"x3 ea 20"x5 30"x3 ea 30"x3 ea 30"x3   TS seated extension    Supine 1'+ LTR    np    10"x10 1/2 foam     Modalities  2/20 2/24 2/27 3/2 3/5 2/3 2/6 2/10 2/13 2/17   MHP  Seated 10' to l/s and c/s seated post np Seated 10' to C/S post  x10' pre C/s, l/s 10' pre supine C/s, l/s 10' pre supine C/s, l/s 10' pre supine C/s, l/s 10' post TE, pre- manuals supine

## 2020-03-13 NOTE — PROGRESS NOTES
Assessment/Plan:      Diagnoses and all orders for this visit:    Chronic bilateral low back pain with bilateral sciatica  -     diclofenac sodium (VOLTAREN) 50 mg EC tablet; Take 1 tablet (50 mg total) by mouth 3 (three) times a day  -     cyclobenzaprine (FLEXERIL) 10 mg tablet; Take 1 tablet (10 mg total) by mouth 3 (three) times a day as needed for muscle spasms  -     MRI lumbar spine wo contrast; Future  -     Ambulatory referral to Pain Management; Future    Cervical radiculopathy  -     MRI cervical spine wo contrast; Future  -     Ambulatory referral to Pain Management; Future    Neck pain  -     MRI cervical spine wo contrast; Future  -     Ambulatory referral to Pain Management; Future    Myofascial pain syndrome  -     Ambulatory referral to Pain Management; Future      discussion:  19-year-old Persian-speaking male presenting for follow-up of chronic neck and low back pain after completion of over 6 weeks of physical therapy  He has attempted PT with limited in pain or symptom relief I do feel it appropriate to move forward with MRI of the cervical and lumbar spine at this time  I will additionally place referral to pain management to discuss possible interventions versus medication management  In the meantime as he noted limited pain relief from naproxen and methocarbamol I will try alternative muscle relaxer and anti-inflammatory  The patient was advised that NSAID-type medications have two very important potential side effects: gastrointestinal irritation including hemorrhage and renal injuries  He was asked to take the medication with food and to stop if he experiences any GI upset  I asked him to call for vomiting, abdominal pain or black/bloody stools  The patient expresses understanding of these issues and questions were answered  The patient was advised that muscle relaxant medications have very important potential side effects including dry mouth, dizziness, and drowsiness/fatigue   He was asked to stop medication if he experiences any adverse side effects, and additionally advised to avoid alcohol, and driving/operating heavy machinery while taking this medication  The patient expresses understanding of these issues and questions were answered  Patient was advised I will call with results MRI imaging may follow up with this office as needed  Patient expresses understanding and agrees to above plan  Subjective:     Patient ID: Shelley Batista is a 27 y o  male  HPI 8 week follow-up for chronic neck and low back pain after completion of 4 weeks of physical therapy  Patient was discharged secondary to lack of progression and ongoing high pain levels  He feels no improvement after completion of physical therapy  Plain films completed at last visit were essentially unremarkable  He was started on vitamin-D supplementation secondary to deficiency  Neck and low back pain described as a constant throbbing aching stabbing pain rated as an 8 or 9/10 on pain scale today  Neck pain will occasionally radiate into bilateral shoulders and right upper extremity  He does of note have a history of bilateral carpal tunnel  He admits to constant numbness and tingling in the bilateral upper extremities, most bothersome in the right hand 1st 3 digits  Low back pain is axial with radiation into bilateral lower extremities with intermittent numbness and tingling in the bilateral calves with prolonged walking or standing  He denies any bowel or bladder changes or saddle anesthesia  Pain continues to be exacerbated with activity but limited relief with rest   He continues to note positive sleep disturbance as well as increased dysphoric mood and anxiety regarding chronicity of his overall condition  He denies suicidal or homicidal ideation      PAST MEDICAL HISTORY  Past Medical History:   Diagnosis Date    Arthritis     Carpal tunnel syndrome     Chronic back pain      PAST SURGICAL HISTORY  Past Surgical History:   Procedure Laterality Date    CARPAL TUNNEL RELEASE         FAMILY HISTORY  History reviewed  No pertinent family history  HOME MEDICATIONS  Current Outpatient Medications   Medication Sig Dispense Refill    acetaminophen (TYLENOL) 500 mg tablet Take 1 tablet (500 mg total) by mouth every 6 (six) hours as needed for mild pain, moderate pain, headaches or fever 30 tablet 0    Cholecalciferol (VITAMIN D3) 1 25 MG (77048 UT) CAPS Take 1 capsule (50,000 Units total) by mouth once a week for 8 doses 8 capsule 0    ibuprofen (MOTRIN) 400 mg tablet Take 1 tablet (400 mg total) by mouth every 6 (six) hours as needed for mild pain, moderate pain or fever 30 tablet 0    cyclobenzaprine (FLEXERIL) 10 mg tablet Take 1 tablet (10 mg total) by mouth 3 (three) times a day as needed for muscle spasms 30 tablet 1    diclofenac sodium (VOLTAREN) 50 mg EC tablet Take 1 tablet (50 mg total) by mouth 3 (three) times a day 90 tablet 0     No current facility-administered medications for this visit  ALLERGIES  Patient has no known allergies  SOCIAL HISTORY  Social History     Substance and Sexual Activity   Alcohol Use No     Social History     Substance and Sexual Activity   Drug Use No     Social History     Tobacco Use   Smoking Status Current Every Day Smoker    Packs/day: 0 50    Types: Cigarettes   Smokeless Tobacco Never Used     Review of Systems   Constitutional: Positive for activity change  Negative for chills, diaphoresis, fatigue, fever and unexpected weight change  HENT: Negative for trouble swallowing  Eyes: Negative for visual disturbance  Respiratory: Negative for cough and shortness of breath  Cardiovascular: Negative for chest pain and leg swelling  Gastrointestinal: Negative for constipation, diarrhea, nausea and vomiting  Endocrine: Negative for polydipsia, polyphagia and polyuria     Genitourinary: Negative for decreased urine volume, difficulty urinating and urgency  Musculoskeletal: Positive for arthralgias (shoulders), back pain, gait problem, myalgias, neck pain and neck stiffness  Negative for joint swelling  Skin: Negative for color change, pallor and rash  Allergic/Immunologic: Negative for immunocompromised state  Neurological: Positive for weakness and numbness  Negative for dizziness, light-headedness and headaches  Hematological: Does not bruise/bleed easily  Psychiatric/Behavioral: Positive for dysphoric mood and sleep disturbance  Negative for self-injury and suicidal ideas  Objective:  Vitals:    03/18/20 1032   BP: 120/86   Pulse: 80       Imaging:  No images are attached to the encounter  Labs:  Appointment on 01/22/2020   Component Date Value Ref Range Status    Vit D, 25-Hydroxy 01/22/2020 5 2* 30 0 - 100 0 ng/mL Final      Physical Exam   Constitutional: He is oriented to person, place, and time  He appears well-developed and well-nourished  No distress  HENT:   Head: Normocephalic and atraumatic  Eyes: Pupils are equal, round, and reactive to light  Conjunctivae are normal    Neck: Neck supple  Cardiovascular: Intact distal pulses  Pulmonary/Chest: Effort normal  No respiratory distress  Musculoskeletal:        Cervical back: He exhibits decreased range of motion and tenderness  He exhibits no bony tenderness, no swelling, no edema and no deformity  Lumbar back: He exhibits decreased range of motion and tenderness  He exhibits no bony tenderness, no swelling, no edema and no deformity  Neurological: He is alert and oriented to person, place, and time  He has normal strength  He is not disoriented  He displays no atrophy and normal reflexes  A sensory deficit (hypoesthesia right 1st 3 digits and left lower calf) is present  No cranial nerve deficit  He exhibits normal muscle tone   Coordination and gait normal    + Tinel's bilateral    - SLR     Able to heel stand, toe stand    Skin: Skin is warm and dry  No rash noted  He is not diaphoretic  No pallor  Psychiatric: His speech is normal and behavior is normal  Judgment and thought content normal  Cognition and memory are normal  He exhibits a depressed mood  He is attentive  Vitals reviewed

## 2020-03-18 ENCOUNTER — OFFICE VISIT (OUTPATIENT)
Dept: PAIN MEDICINE | Facility: CLINIC | Age: 35
End: 2020-03-18
Payer: COMMERCIAL

## 2020-03-18 VITALS
WEIGHT: 217 LBS | BODY MASS INDEX: 39.93 KG/M2 | SYSTOLIC BLOOD PRESSURE: 120 MMHG | DIASTOLIC BLOOD PRESSURE: 86 MMHG | HEART RATE: 80 BPM | HEIGHT: 62 IN

## 2020-03-18 DIAGNOSIS — M54.2 NECK PAIN: ICD-10-CM

## 2020-03-18 DIAGNOSIS — M79.18 MYOFASCIAL PAIN SYNDROME: ICD-10-CM

## 2020-03-18 DIAGNOSIS — M54.41 CHRONIC BILATERAL LOW BACK PAIN WITH BILATERAL SCIATICA: Primary | ICD-10-CM

## 2020-03-18 DIAGNOSIS — M54.12 CERVICAL RADICULOPATHY: ICD-10-CM

## 2020-03-18 DIAGNOSIS — M54.42 CHRONIC BILATERAL LOW BACK PAIN WITH BILATERAL SCIATICA: Primary | ICD-10-CM

## 2020-03-18 DIAGNOSIS — G89.29 CHRONIC BILATERAL LOW BACK PAIN WITH BILATERAL SCIATICA: Primary | ICD-10-CM

## 2020-03-18 PROCEDURE — 99214 OFFICE O/P EST MOD 30 MIN: CPT | Performed by: PHYSICIAN ASSISTANT

## 2020-03-18 RX ORDER — CYCLOBENZAPRINE HCL 10 MG
10 TABLET ORAL 3 TIMES DAILY PRN
Qty: 30 TABLET | Refills: 1 | Status: SHIPPED | OUTPATIENT
Start: 2020-03-18

## 2020-04-02 ENCOUNTER — TRANSCRIBE ORDERS (OUTPATIENT)
Dept: ADMINISTRATIVE | Facility: HOSPITAL | Age: 35
End: 2020-04-02

## 2020-04-02 DIAGNOSIS — R93.5 ABNORMAL US (ULTRASOUND) OF ABDOMEN: ICD-10-CM

## 2020-04-02 DIAGNOSIS — K76.0 FATTY LIVER: Primary | ICD-10-CM

## 2020-04-02 DIAGNOSIS — R79.89 ELEVATED LFTS: ICD-10-CM

## 2020-06-09 ENCOUNTER — TRANSCRIBE ORDERS (OUTPATIENT)
Dept: ADMINISTRATIVE | Facility: HOSPITAL | Age: 35
End: 2020-06-09

## 2020-06-09 ENCOUNTER — APPOINTMENT (OUTPATIENT)
Dept: LAB | Facility: HOSPITAL | Age: 35
End: 2020-06-09
Payer: COMMERCIAL

## 2020-06-09 DIAGNOSIS — R94.5 NONSPECIFIC ABNORMAL RESULTS OF LIVER FUNCTION STUDY: ICD-10-CM

## 2020-06-09 DIAGNOSIS — R20.0 ANESTHESIA OF SKIN: ICD-10-CM

## 2020-06-09 DIAGNOSIS — R93.5 ABNORMAL ABDOMINAL ULTRASOUND: ICD-10-CM

## 2020-06-09 DIAGNOSIS — K76.0 FATTY LIVER: ICD-10-CM

## 2020-06-09 DIAGNOSIS — K76.0 FATTY LIVER: Primary | ICD-10-CM

## 2020-06-09 LAB
ALBUMIN SERPL BCP-MCNC: 3.7 G/DL (ref 3.5–5)
ALP SERPL-CCNC: 75 U/L (ref 46–116)
ALT SERPL W P-5'-P-CCNC: 87 U/L (ref 12–78)
ANION GAP SERPL CALCULATED.3IONS-SCNC: 5 MMOL/L (ref 4–13)
AST SERPL W P-5'-P-CCNC: 37 U/L (ref 5–45)
BILIRUB SERPL-MCNC: 0.36 MG/DL (ref 0.2–1)
BUN SERPL-MCNC: 17 MG/DL (ref 5–25)
CALCIUM SERPL-MCNC: 9 MG/DL (ref 8.3–10.1)
CHLORIDE SERPL-SCNC: 102 MMOL/L (ref 100–108)
CHOLEST SERPL-MCNC: 199 MG/DL (ref 50–200)
CO2 SERPL-SCNC: 31 MMOL/L (ref 21–32)
CREAT SERPL-MCNC: 0.93 MG/DL (ref 0.6–1.3)
ERYTHROCYTE [DISTWIDTH] IN BLOOD BY AUTOMATED COUNT: 13.3 % (ref 11.6–15.1)
EST. AVERAGE GLUCOSE BLD GHB EST-MCNC: 134 MG/DL
FOLATE SERPL-MCNC: 13.5 NG/ML (ref 3.1–17.5)
GFR SERPL CREATININE-BSD FRML MDRD: 106 ML/MIN/1.73SQ M
GLUCOSE P FAST SERPL-MCNC: 109 MG/DL (ref 65–99)
HBA1C MFR BLD: 6.3 %
HCT VFR BLD AUTO: 41.2 % (ref 36.5–49.3)
HDLC SERPL-MCNC: 32 MG/DL
HGB BLD-MCNC: 13.1 G/DL (ref 12–17)
LDLC SERPL CALC-MCNC: 122 MG/DL (ref 0–100)
MCH RBC QN AUTO: 27.7 PG (ref 26.8–34.3)
MCHC RBC AUTO-ENTMCNC: 31.8 G/DL (ref 31.4–37.4)
MCV RBC AUTO: 87 FL (ref 82–98)
NONHDLC SERPL-MCNC: 167 MG/DL
PLATELET # BLD AUTO: 209 THOUSANDS/UL (ref 149–390)
PMV BLD AUTO: 9.1 FL (ref 8.9–12.7)
POTASSIUM SERPL-SCNC: 4.4 MMOL/L (ref 3.5–5.3)
PROT SERPL-MCNC: 7.3 G/DL (ref 6.4–8.2)
RBC # BLD AUTO: 4.73 MILLION/UL (ref 3.88–5.62)
SODIUM SERPL-SCNC: 138 MMOL/L (ref 136–145)
TRIGL SERPL-MCNC: 227 MG/DL
VIT B12 SERPL-MCNC: 304 PG/ML (ref 100–900)
WBC # BLD AUTO: 7.71 THOUSAND/UL (ref 4.31–10.16)

## 2020-06-09 PROCEDURE — 82607 VITAMIN B-12: CPT

## 2020-06-09 PROCEDURE — 80061 LIPID PANEL: CPT

## 2020-06-09 PROCEDURE — 82746 ASSAY OF FOLIC ACID SERUM: CPT

## 2020-06-09 PROCEDURE — 83036 HEMOGLOBIN GLYCOSYLATED A1C: CPT

## 2020-06-09 PROCEDURE — 80053 COMPREHEN METABOLIC PANEL: CPT

## 2020-06-09 PROCEDURE — 36415 COLL VENOUS BLD VENIPUNCTURE: CPT

## 2020-06-09 PROCEDURE — 85027 COMPLETE CBC AUTOMATED: CPT

## 2021-01-25 ENCOUNTER — HOSPITAL ENCOUNTER (EMERGENCY)
Facility: HOSPITAL | Age: 36
Discharge: HOME | End: 2021-01-25
Attending: EMERGENCY MEDICINE | Admitting: EMERGENCY MEDICINE
Payer: COMMERCIAL

## 2021-01-25 ENCOUNTER — APPOINTMENT (EMERGENCY)
Dept: CT IMAGING | Facility: HOSPITAL | Age: 36
End: 2021-01-25
Payer: COMMERCIAL

## 2021-01-25 VITALS
BODY MASS INDEX: 35.31 KG/M2 | OXYGEN SATURATION: 100 % | SYSTOLIC BLOOD PRESSURE: 133 MMHG | TEMPERATURE: 98 F | RESPIRATION RATE: 18 BRPM | HEIGHT: 63 IN | WEIGHT: 199.3 LBS | DIASTOLIC BLOOD PRESSURE: 70 MMHG | HEART RATE: 67 BPM

## 2021-01-25 DIAGNOSIS — K11.20 SIALOADENITIS: Primary | ICD-10-CM

## 2021-01-25 DIAGNOSIS — K02.9 DENTAL CARIES: ICD-10-CM

## 2021-01-25 DIAGNOSIS — L03.90 CELLULITIS: ICD-10-CM

## 2021-01-25 LAB
ANION GAP SERPL CALCULATED.3IONS-SCNC: 7 MMOL/L (ref 4–13)
BASOPHILS # BLD AUTO: 0.04 THOUSANDS/ΜL (ref 0–0.1)
BASOPHILS NFR BLD AUTO: 0 % (ref 0–1)
BUN SERPL-MCNC: 13 MG/DL (ref 5–25)
CALCIUM SERPL-MCNC: 8.8 MG/DL (ref 8.3–10.1)
CHLORIDE SERPL-SCNC: 101 MMOL/L (ref 100–108)
CO2 SERPL-SCNC: 30 MMOL/L (ref 21–32)
CREAT SERPL-MCNC: 0.83 MG/DL (ref 0.6–1.3)
EOSINOPHIL # BLD AUTO: 0.22 THOUSAND/ΜL (ref 0–0.61)
EOSINOPHIL NFR BLD AUTO: 2 % (ref 0–6)
ERYTHROCYTE [DISTWIDTH] IN BLOOD BY AUTOMATED COUNT: 13.2 % (ref 11.6–15.1)
GFR SERPL CREATININE-BSD FRML MDRD: 113 ML/MIN/1.73SQ M
GLUCOSE SERPL-MCNC: 96 MG/DL (ref 65–140)
HCT VFR BLD AUTO: 39.7 % (ref 36.5–49.3)
HGB BLD-MCNC: 12.6 G/DL (ref 12–17)
IMM GRANULOCYTES # BLD AUTO: 0.04 THOUSAND/UL (ref 0–0.2)
IMM GRANULOCYTES NFR BLD AUTO: 0 % (ref 0–2)
LYMPHOCYTES # BLD AUTO: 3.15 THOUSANDS/ΜL (ref 0.6–4.47)
LYMPHOCYTES NFR BLD AUTO: 27 % (ref 14–44)
MCH RBC QN AUTO: 27.7 PG (ref 26.8–34.3)
MCHC RBC AUTO-ENTMCNC: 31.7 G/DL (ref 31.4–37.4)
MCV RBC AUTO: 87 FL (ref 82–98)
MONOCYTES # BLD AUTO: 0.68 THOUSAND/ΜL (ref 0.17–1.22)
MONOCYTES NFR BLD AUTO: 6 % (ref 4–12)
NEUTROPHILS # BLD AUTO: 7.53 THOUSANDS/ΜL (ref 1.85–7.62)
NEUTS SEG NFR BLD AUTO: 65 % (ref 43–75)
NRBC BLD AUTO-RTO: 0 /100 WBCS
PLATELET # BLD AUTO: 226 THOUSANDS/UL (ref 149–390)
PMV BLD AUTO: 8.9 FL (ref 8.9–12.7)
POTASSIUM SERPL-SCNC: 3.7 MMOL/L (ref 3.5–5.3)
RBC # BLD AUTO: 4.55 MILLION/UL (ref 3.88–5.62)
SODIUM SERPL-SCNC: 138 MMOL/L (ref 136–145)
WBC # BLD AUTO: 11.66 THOUSAND/UL (ref 4.31–10.16)

## 2021-01-25 PROCEDURE — 96361 HYDRATE IV INFUSION ADD-ON: CPT

## 2021-01-25 PROCEDURE — 70491 CT SOFT TISSUE NECK W/DYE: CPT

## 2021-01-25 PROCEDURE — 85025 COMPLETE CBC W/AUTO DIFF WBC: CPT | Performed by: PHYSICIAN ASSISTANT

## 2021-01-25 PROCEDURE — 80048 BASIC METABOLIC PNL TOTAL CA: CPT | Performed by: PHYSICIAN ASSISTANT

## 2021-01-25 PROCEDURE — 96374 THER/PROPH/DIAG INJ IV PUSH: CPT

## 2021-01-25 PROCEDURE — 99284 EMERGENCY DEPT VISIT MOD MDM: CPT

## 2021-01-25 PROCEDURE — 99284 EMERGENCY DEPT VISIT MOD MDM: CPT | Performed by: PHYSICIAN ASSISTANT

## 2021-01-25 PROCEDURE — 36415 COLL VENOUS BLD VENIPUNCTURE: CPT | Performed by: PHYSICIAN ASSISTANT

## 2021-01-25 RX ORDER — CEPHALEXIN 250 MG/1
500 CAPSULE ORAL ONCE
Status: COMPLETED | OUTPATIENT
Start: 2021-01-25 | End: 2021-01-25

## 2021-01-25 RX ORDER — IBUPROFEN 800 MG/1
800 TABLET ORAL EVERY 8 HOURS PRN
Qty: 21 TABLET | Refills: 0 | Status: SHIPPED | OUTPATIENT
Start: 2021-01-25

## 2021-01-25 RX ORDER — ONDANSETRON 4 MG/1
4 TABLET, ORALLY DISINTEGRATING ORAL ONCE
Status: DISCONTINUED | OUTPATIENT
Start: 2021-01-25 | End: 2021-01-25

## 2021-01-25 RX ORDER — KETOROLAC TROMETHAMINE 30 MG/ML
15 INJECTION, SOLUTION INTRAMUSCULAR; INTRAVENOUS ONCE
Status: COMPLETED | OUTPATIENT
Start: 2021-01-25 | End: 2021-01-25

## 2021-01-25 RX ORDER — CEPHALEXIN 500 MG/1
500 CAPSULE ORAL EVERY 6 HOURS SCHEDULED
Qty: 28 CAPSULE | Refills: 0 | Status: SHIPPED | OUTPATIENT
Start: 2021-01-25 | End: 2021-02-01

## 2021-01-25 RX ADMIN — IOHEXOL 85 ML: 350 INJECTION, SOLUTION INTRAVENOUS at 18:21

## 2021-01-25 RX ADMIN — SODIUM CHLORIDE 1000 ML: 0.9 INJECTION, SOLUTION INTRAVENOUS at 17:39

## 2021-01-25 RX ADMIN — DEXAMETHASONE SODIUM PHOSPHATE 10 MG: 10 INJECTION, SOLUTION INTRAMUSCULAR; INTRAVENOUS at 18:55

## 2021-01-25 RX ADMIN — CEPHALEXIN 500 MG: 250 CAPSULE ORAL at 18:54

## 2021-01-25 RX ADMIN — KETOROLAC TROMETHAMINE 15 MG: 30 INJECTION, SOLUTION INTRAMUSCULAR at 17:39

## 2021-01-25 NOTE — ED PROVIDER NOTES
History  Chief Complaint   Patient presents with    Jaw Pain     patient reports right lower jaw pain and swelling that began today  denies fevers or trouble swallowing  Patient is a 37 y/o male presenting to the ED for evaluation of right lower jaw pain/swelling  Pt states that he began with swelling and pain to right lower jaw today  Pt states he does have dental caries, but they have not been giving him pain  Pt states pain worse with ROM  Pt has not taken any medication for her sx  Pt denies fever, chills, difficulty swallowing, drooling, difficulty breathing  Prior to Admission Medications   Prescriptions Last Dose Informant Patient Reported? Taking? Cholecalciferol (VITAMIN D3) 1 25 MG (10292 UT) CAPS   No No   Sig: Take 1 capsule (50,000 Units total) by mouth once a week for 8 doses   acetaminophen (TYLENOL) 500 mg tablet   No No   Sig: Take 1 tablet (500 mg total) by mouth every 6 (six) hours as needed for mild pain, moderate pain, headaches or fever   cyclobenzaprine (FLEXERIL) 10 mg tablet   No No   Sig: Take 1 tablet (10 mg total) by mouth 3 (three) times a day as needed for muscle spasms   diclofenac sodium (VOLTAREN) 50 mg EC tablet   No No   Sig: Take 1 tablet (50 mg total) by mouth 3 (three) times a day   ibuprofen (MOTRIN) 400 mg tablet   No No   Sig: Take 1 tablet (400 mg total) by mouth every 6 (six) hours as needed for mild pain, moderate pain or fever      Facility-Administered Medications: None       Past Medical History:   Diagnosis Date    Arthritis     Carpal tunnel syndrome     Chronic back pain        Past Surgical History:   Procedure Laterality Date    CARPAL TUNNEL RELEASE         History reviewed  No pertinent family history  I have reviewed and agree with the history as documented      E-Cigarette/Vaping     E-Cigarette/Vaping Substances     Social History     Tobacco Use    Smoking status: Current Every Day Smoker     Packs/day: 0 50     Types: Cigarettes    Smokeless tobacco: Never Used   Substance Use Topics    Alcohol use: No    Drug use: No       Review of Systems   HENT:        Right lower jaw pain/swelling   All other systems reviewed and are negative  Physical Exam  Physical Exam  Constitutional:       Appearance: He is well-developed  HENT:      Head: Normocephalic and atraumatic  Right Ear: External ear normal       Left Ear: External ear normal       Nose: Nose normal       Mouth/Throat:      Lips: Pink  Mouth: Mucous membranes are moist       Dentition: Abnormal dentition  Dental caries present  No dental tenderness, gingival swelling or dental abscesses  Pharynx: Oropharynx is clear  Uvula midline  Comments: Airway patent  Neck:      Musculoskeletal: Normal range of motion  Cardiovascular:      Rate and Rhythm: Normal rate and regular rhythm  Pulmonary:      Effort: Pulmonary effort is normal       Breath sounds: Normal breath sounds  Musculoskeletal: Normal range of motion  Skin:     General: Skin is warm and dry  Neurological:      Mental Status: He is alert and oriented to person, place, and time     Psychiatric:         Behavior: Behavior normal          Vital Signs  ED Triage Vitals   Temperature Pulse Respirations Blood Pressure SpO2   01/25/21 1637 01/25/21 1637 01/25/21 1637 01/25/21 1637 01/25/21 1637   98 °F (36 7 °C) 84 18 148/76 99 %      Temp Source Heart Rate Source Patient Position - Orthostatic VS BP Location FiO2 (%)   01/25/21 1637 01/25/21 1637 01/25/21 1856 01/25/21 1856 --   Temporal Monitor Lying Right arm       Pain Score       01/25/21 1637       5           Vitals:    01/25/21 1637 01/25/21 1856   BP: 148/76 133/70   Pulse: 84 67   Patient Position - Orthostatic VS:  Lying         Visual Acuity      ED Medications  Medications   sodium chloride 0 9 % bolus 1,000 mL (0 mL Intravenous Stopped 1/25/21 1839)   ketorolac (TORADOL) injection 15 mg (15 mg Intravenous Given 1/25/21 1739)   iohexol (OMNIPAQUE) 350 MG/ML injection (SINGLE-DOSE) 85 mL (85 mL Intravenous Given 1/25/21 1821)   dexamethasone oral liquid 10 mg 1 mL (10 mg Oral Given 1/25/21 1855)   cephalexin (KEFLEX) capsule 500 mg (500 mg Oral Given 1/25/21 1854)       Diagnostic Studies  Results Reviewed     Procedure Component Value Units Date/Time    Basic metabolic panel [[id number]] Collected: 01/25/21 1731    Lab Status: Final result Specimen: Blood from Arm, Left Updated: 01/25/21 1750     Sodium 138 mmol/L      Potassium 3 7 mmol/L      Chloride 101 mmol/L      CO2 30 mmol/L      ANION GAP 7 mmol/L      BUN 13 mg/dL      Creatinine 0 83 mg/dL      Glucose 96 mg/dL      Calcium 8 8 mg/dL      eGFR 113 ml/min/1 73sq m     Narrative:      [place] guidelines for Chronic Kidney Disease (CKD):     Stage 1 with normal or high GFR (GFR > 90 mL/min/1 73 square meters)    Stage 2 Mild CKD (GFR = 60-89 mL/min/1 73 square meters)    Stage 3A Moderate CKD (GFR = 45-59 mL/min/1 73 square meters)    Stage 3B Moderate CKD (GFR = 30-44 mL/min/1 73 square meters)    Stage 4 Severe CKD (GFR = 15-29 mL/min/1 73 square meters)    Stage 5 End Stage CKD (GFR <15 mL/min/1 73 square meters)  Note: GFR calculation is accurate only with a steady state creatinine    CBC and differential [[id number]]  (Abnormal) Collected: 01/25/21 1731    Lab Status: Final result Specimen: Blood from Arm, Left Updated: 01/25/21 1740     WBC 11 66 Thousand/uL      RBC 4 55 Million/uL      Hemoglobin 12 6 g/dL      Hematocrit 39 7 %      MCV 87 fL      MCH 27 7 pg      MCHC 31 7 g/dL      RDW 13 2 %      MPV 8 9 fL      Platelets 120 Thousands/uL      nRBC 0 /100 WBCs      Neutrophils Relative 65 %      Immat GRANS % 0 %      Lymphocytes Relative 27 %      Monocytes Relative 6 %      Eosinophils Relative 2 %      Basophils Relative 0 %      Neutrophils Absolute 7 53 Thousands/µL      Immature Grans Absolute 0 04 Thousand/uL      Lymphocytes Absolute 3 15 Thousands/µL      Monocytes Absolute 0 68 Thousand/µL      Eosinophils Absolute 0 22 Thousand/µL      Basophils Absolute 0 04 Thousands/µL                  CT soft tissue neck with contrast   Final Result by Ashlie Joiner MD (01/25 1829)      Right posterior maxillary molar tooth erosion and lucency suggesting infection within and around the tooth  There is adjacent soft tissue swelling along the right maxillary soft tissues suggesting infection such as cellulitis  No obvious fluid collection    although limited from streak artifact from teeth implant hardware  Suspect right parotid sialoadenitis and thickening of the right platysma muscle suggesting infection/cellulitis  Workstation performed: [id number]                    Procedures  Procedures         ED Course  ED Course as of Jan 25 2049   Mon Jan 25, 2021   1840 Right posterior maxillary molar tooth erosion and lucency suggesting infection within and around the tooth  There is adjacent soft tissue swelling along the right maxillary soft tissues suggesting infection such as cellulitis  No obvious fluid collection   although limited from streak artifact from teeth implant hardware      Suspect right parotid sialoadenitis and thickening of the right platysma muscle suggesting infection/cellulitis       CT soft tissue neck with contrast                             SBIRT 20yo+      Most Recent Value   SBIRT (24 yo +)   In order to provide better care to our patients, we are screening all of our patients for alcohol and drug use  Would it be okay to ask you these screening questions? No Filed at: 01/25/2021 1703   Initial Alcohol Screen: US AUDIT-C    1  How often do you have a drink containing alcohol?  0 Filed at: 01/25/2021 1703   2  How many drinks containing alcohol do you have on a typical day you are drinking? 0 Filed at: 01/25/2021 1703   3a  Male UNDER 65: How often do you have five or more drinks on one occasion?   0 Filed at: 01/25/2021 1703   3b  FEMALE Any Age, or MALE 65+: How often do you have 4 or more drinks on one occassion? 0 Filed at: 01/25/2021 1703   Audit-C Score  0 Filed at: 01/25/2021 1703                    MDM  Number of Diagnoses or Management Options  Cellulitis: new and does not require workup  Dental caries: established and worsening  Sialoadenitis: new and does not require workup  Diagnosis management comments: Patient is a 39 y/o male presenting to the ED for evaluation of right lower jaw pain/swelling  Pt states that he began with swelling and pain to right lower jaw today  Mild leukocytosis, vital signs WNL  CT soft tissue neck shows Right posterior maxillary molar tooth erosion and lucency suggesting infection within and around the tooth  There is adjacent soft tissue swelling along the right maxillary soft tissues suggesting infection such as cellulitis  No obvious fluid collection although limited from streak artifact from teeth implant hardware  Suspect right parotid sialoadenitis and thickening of the right platysma muscle suggesting infection/cellulitis      Toradol, IVF, keflex and decadron given in ED with improvement in sx  Rx for 7 day course of keflex provided to cover for secondary infection  Encouraged patient to use sialogues for improvement  Information for ENT provided to patient and encouraged to f/u if sx do not improve  Patient verbalizes understanding and agrees with plan  The management plan was discussed in detail with the patient at bedside and all questions were answered  Prior to discharge, I provided both verbal and written instructions  I discussed with the patient the signs and symptoms for which to return to the emergency department  All questions were answered and patient was comfortable with the plan of care and discharged to home  The patient agrees to return to the Emergency Department for concerns and/or progression of illness        Disposition  Final diagnoses:   Sialoadenitis Cellulitis   Dental caries     Time reflects when diagnosis was documented in both MDM as applicable and the Disposition within this note     Time User Action Codes Description Comment    1/25/2021  6:52 PM Michael Tolentino Add [K11 20] Sialoadenitis     1/25/2021  6:52 PM Michael Tolentino Add [L03 90] Cellulitis     1/25/2021  6:52 PM Michael Rajan Add [K02 9] Dental caries       ED Disposition     ED Disposition Condition Date/Time Comment    Discharge Stable Mon Jan 25, 2021  7:26 PM [facility] discharge to home/self care  Follow-up Information     Follow up With Specialties Details Why Contact Info Additional Information    Yaniv Hinojosa MD [facility] Medicine Call   [address] [facility],Third Floor, [address] [facility] [id number] [phone] [address],[place] [address] ENT Otolaryngology Schedule an appointment as soon as possible for a visit  If symptoms worsen [address] [facility] [address]  [address], [address] [place] [address], [id number] [phone]          Discharge Medication List as of 1/25/2021  7:27 PM      START taking these medications    Details   cephalexin (KEFLEX) 500 mg capsule Take 1 capsule (500 mg total) by mouth every 6 (six) hours for 7 days, Starting Mon 1/25/2021, Until Mon 2/1/2021, Print      !! ibuprofen (MOTRIN) 800 mg tablet Take 1 tablet (800 mg total) by mouth every 8 (eight) hours as needed for moderate pain, Starting Mon 1/25/2021, Print       !! - Potential duplicate medications found  Please discuss with provider        CONTINUE these medications which have NOT CHANGED    Details   acetaminophen (TYLENOL) 500 mg tablet Take 1 tablet (500 mg total) by mouth every 6 (six) hours as needed for mild pain, moderate pain, headaches or fever, Starting Sat 2/9/2019, Print      Cholecalciferol (VITAMIN D3) 1 25 MG (76057 UT) CAPS Take 1 capsule (50,000 Units total) by mouth once a week for 8 doses, Starting Thu 1/23/2020, Until Wed 3/18/2020, Normal      cyclobenzaprine (FLEXERIL) 10 mg tablet Take 1 tablet (10 mg total) by mouth 3 (three) times a day as needed for muscle spasms, Starting Wed 3/18/2020, Normal      diclofenac sodium (VOLTAREN) 50 mg EC tablet Take 1 tablet (50 mg total) by mouth 3 (three) times a day, Starting Wed 3/18/2020, Normal      !! ibuprofen (MOTRIN) 400 mg tablet Take 1 tablet (400 mg total) by mouth every 6 (six) hours as needed for mild pain, moderate pain or fever, Starting Sat 2/9/2019, Print       !! - Potential duplicate medications found  Please discuss with provider  No discharge procedures on file      PDMP Review     None          ED Provider  Electronically Signed by           Magdalena Ward PA-C  01/25/21 2048

## 2021-01-26 NOTE — DISCHARGE INSTRUCTIONS
Los antibióticos podrían administrarse para tratar [employer]  Acetaminofén [employer] dolor y baja la fiebre  Está disponible sin receta médica  Pregunte qué cantidad debe darle a christy osito y con qué frecuencia  [address]  Daniela las etiquetas de todos los demás medicamentos que esté tomando christy hijo para saber si también contienen acetaminofén, o pregunte a christy médico o farmacéutico  El acetaminofén puede causar daño en el hígado cuando no se drew de forma correcta  Los [place], [place] el ibuprofeno, [facility] [facility] a disminuir la inflamación, el dolor y la [place]  [place] medicamento está disponible con o sin donaldo receta médica  Los BERTRAND pueden causar sangrado estomacal o problemas renales en ciertas personas  Si usted drew un medicamento anticoagulante, siempre pregúntele a christy médico si los BERTRAND son seguros para usted  Siempre daniela la etiqueta de marsha medicamento y [place] instrucciones  [place] raj medicamentos [place] se le haya indicado  Consulte con christy médico si usted neil que christy medicamento no le está ayudando o si presenta efectos secundarios  Infórmele si es alérgico a cualquier medicamento  Mantenga donaldo lista actualizada de los [place], las vitaminas y los productos herbales que drew  Incluya los siguientes datos de los medicamentos: cantidad, frecuencia y motivo de administración  Traiga con usted la lista o los envases de las píldoras a raj citas de seguimiento  Lleve la lista de los medicamentos con usted en laila de donaldo emergencia

## 2023-08-20 ENCOUNTER — HOSPITAL ENCOUNTER (EMERGENCY)
Facility: HOSPITAL | Age: 38
Discharge: HOME | End: 2023-08-20
Attending: EMERGENCY MEDICINE | Admitting: EMERGENCY MEDICINE
Payer: COMMERCIAL

## 2023-08-20 VITALS
OXYGEN SATURATION: 99 % | HEART RATE: 83 BPM | DIASTOLIC BLOOD PRESSURE: 80 MMHG | TEMPERATURE: 98.2 F | BODY MASS INDEX: 34.95 KG/M2 | SYSTOLIC BLOOD PRESSURE: 148 MMHG | WEIGHT: 197.31 LBS | RESPIRATION RATE: 18 BRPM

## 2023-08-20 DIAGNOSIS — K08.89 PAIN, DENTAL: Primary | ICD-10-CM

## 2023-08-20 LAB
BILIRUB UR QL STRIP: NEGATIVE
CLARITY UR: CLEAR
COLOR UR: YELLOW
GLUCOSE UR STRIP-MCNC: NEGATIVE MG/DL
HGB UR QL STRIP.AUTO: NEGATIVE
KETONES UR STRIP-MCNC: NEGATIVE MG/DL
LEUKOCYTE ESTERASE UR QL STRIP: NEGATIVE
NITRITE UR QL STRIP: NEGATIVE
PH UR STRIP.AUTO: 5.5 [PH] (ref 4.5–8)
PROT UR STRIP-MCNC: NEGATIVE MG/DL
SP GR UR STRIP.AUTO: >=1.03 (ref 1–1.03)
UROBILINOGEN UR QL STRIP.AUTO: 0.2 E.U./DL

## 2023-08-20 PROCEDURE — 99282 EMERGENCY DEPT VISIT SF MDM: CPT

## 2023-08-20 PROCEDURE — 81003 URINALYSIS AUTO W/O SCOPE: CPT

## 2023-08-20 PROCEDURE — 99284 EMERGENCY DEPT VISIT MOD MDM: CPT

## 2023-08-20 PROCEDURE — 96372 THER/PROPH/DIAG INJ SC/IM: CPT

## 2023-08-20 RX ORDER — OXYCODONE HYDROCHLORIDE 5 MG/1
5 TABLET ORAL ONCE
Status: COMPLETED | OUTPATIENT
Start: 2023-08-20 | End: 2023-08-20

## 2023-08-20 RX ORDER — PENICILLIN V POTASSIUM 250 MG/1
500 TABLET ORAL ONCE
Status: COMPLETED | OUTPATIENT
Start: 2023-08-20 | End: 2023-08-20

## 2023-08-20 RX ORDER — KETOROLAC TROMETHAMINE 30 MG/ML
30 INJECTION, SOLUTION INTRAMUSCULAR; INTRAVENOUS ONCE
Status: COMPLETED | OUTPATIENT
Start: 2023-08-20 | End: 2023-08-20

## 2023-08-20 RX ORDER — PENICILLIN V POTASSIUM 500 MG/1
500 TABLET ORAL 4 TIMES DAILY
Qty: 28 TABLET | Refills: 0 | Status: SHIPPED | OUTPATIENT
Start: 2023-08-20 | End: 2023-08-27

## 2023-08-20 RX ORDER — ACETAMINOPHEN 325 MG/1
975 TABLET ORAL ONCE
Status: COMPLETED | OUTPATIENT
Start: 2023-08-20 | End: 2023-08-20

## 2023-08-20 RX ADMIN — OXYCODONE HYDROCHLORIDE 5 MG: 5 TABLET ORAL at 01:53

## 2023-08-20 RX ADMIN — KETOROLAC TROMETHAMINE 30 MG: 30 INJECTION, SOLUTION INTRAMUSCULAR; INTRAVENOUS at 01:54

## 2023-08-20 RX ADMIN — PENICILLIN V POTASSIUM 500 MG: 250 TABLET, FILM COATED ORAL at 01:53

## 2023-08-20 RX ADMIN — BENZOCAINE 1 APPLICATION: 220 GEL, DENTIFRICE DENTAL at 01:56

## 2023-08-20 RX ADMIN — ACETAMINOPHEN 325MG 975 MG: 325 TABLET ORAL at 01:53

## 2023-08-20 NOTE — DISCHARGE INSTRUCTIONS
Please return to the ED for any concerns as outlined in the AVS or for any other concerns. Please follow-up with your primary care provider and oral maxillofacial surgery at the contact number provided in 1 days for re-evaluation and further management. Continue over-the-counter ibuprofen and acetaminophen for pain control. Continue full course of antibiotics as prescribed. Can also consider over-the-counter Orajel or similar to help with pain control. Continue stay well-hydrated.

## 2023-08-20 NOTE — ED PROVIDER NOTES
History  Chief Complaint   Patient presents with   • Dental Pain     Patient reports wisdom tooth pain, reports he has not been able to get into an appt with a dentist and reports he cannot sleep d/t pain. 67-year-old male with no reported past medical history presented to the ED with complaints of dental pain. Patient reports pain all over his mouth. Patient reports he has multiple dental issues, has been following with his dentist however patient needs teeth pulled and unfortunately his dentist cannot do that. Patient reports he was advised he is to have a surgical procedure to have the teeth removed. Patient does not have an appointment for this. Reports has been dealing with dental pain for some time now. However the last few days the pain has been significantly worse. Did take ibuprofen earlier today without any significant relief. States the pain is increasing causing to have right ear pain. Denies any other pain medications PTA. Reports he has otherwise been well and denies any other complaints today. Specific denies fever, chills, cough, sore throat, CP, SOB, difficulty swallowing, throat swelling, lip swelling, tongue swelling, abdominal pain, N/V/D, urinary complaints, neck pain, back pain, headache, confusion, weakness and any other complaint. No recent antibiotics or antibiotic allergies. Scratch Hard used              Prior to Admission Medications   Prescriptions Last Dose Informant Patient Reported? Taking?    Cholecalciferol (VITAMIN D3) 1.25 MG (61876 UT) CAPS   No No   Sig: Take 1 capsule (50,000 Units total) by mouth once a week for 8 doses   acetaminophen (TYLENOL) 500 mg tablet   No No   Sig: Take 1 tablet (500 mg total) by mouth every 6 (six) hours as needed for mild pain, moderate pain, headaches or fever   cyclobenzaprine (FLEXERIL) 10 mg tablet   No No   Sig: Take 1 tablet (10 mg total) by mouth 3 (three) times a day as needed for muscle spasms   diclofenac sodium (VOLTAREN) 50 mg EC tablet   No No   Sig: Take 1 tablet (50 mg total) by mouth 3 (three) times a day   ibuprofen (MOTRIN) 400 mg tablet   No No   Sig: Take 1 tablet (400 mg total) by mouth every 6 (six) hours as needed for mild pain, moderate pain or fever   ibuprofen (MOTRIN) 800 mg tablet   No No   Sig: Take 1 tablet (800 mg total) by mouth every 8 (eight) hours as needed for moderate pain      Facility-Administered Medications: None       Past Medical History:   Diagnosis Date   • Arthritis    • Carpal tunnel syndrome    • Chronic back pain        Past Surgical History:   Procedure Laterality Date   • CARPAL TUNNEL RELEASE         History reviewed. No pertinent family history. I have reviewed and agree with the history as documented. E-Cigarette/Vaping     E-Cigarette/Vaping Substances     Social History     Tobacco Use   • Smoking status: Every Day     Packs/day: 0.50     Types: Cigarettes   • Smokeless tobacco: Never   Substance Use Topics   • Alcohol use: No   • Drug use: No       Review of Systems   HENT: Positive for dental problem. All other systems reviewed and are negative. Physical Exam  Physical Exam  Vitals and nursing note reviewed. Constitutional:       General: He is not in acute distress. Appearance: He is well-developed. HENT:      Head: Normocephalic and atraumatic. Mouth/Throat:      Comments: Overall very poor dentition throughout the mouth. Patient with likely multiple dental caries and multiple dental erosions. There are also some teeth missing. Patient with TTP over all 4 quadrants of the mouth. No gingival swelling, abscess or discharge in the mouth. No lesions or sores. MMM and oropharynx otherwise patent and clear. Uvula midline. No induration or TTP below the tongue or in the submandibular area. Eyes:      Conjunctiva/sclera: Conjunctivae normal.   Cardiovascular:      Rate and Rhythm: Normal rate and regular rhythm.       Heart sounds: No murmur heard.  Pulmonary:      Effort: Pulmonary effort is normal. No respiratory distress. Breath sounds: Normal breath sounds. Abdominal:      Palpations: Abdomen is soft. Tenderness: There is no abdominal tenderness. Musculoskeletal:         General: No swelling. Cervical back: Neck supple. Skin:     General: Skin is warm and dry. Capillary Refill: Capillary refill takes less than 2 seconds. Neurological:      Mental Status: He is alert. Psychiatric:         Mood and Affect: Mood normal.         Vital Signs  ED Triage Vitals [08/20/23 0034]   Temperature Pulse Respirations Blood Pressure SpO2   98.2 °F (36.8 °C) 83 18 148/80 99 %      Temp Source Heart Rate Source Patient Position - Orthostatic VS BP Location FiO2 (%)   Oral Monitor Sitting Right arm --      Pain Score       10 - Worst Possible Pain           Vitals:    08/20/23 0034   BP: 148/80   Pulse: 83   Patient Position - Orthostatic VS: Sitting         Visual Acuity      ED Medications  Medications   ketorolac (TORADOL) injection 30 mg (30 mg Intramuscular Given 8/20/23 0154)   acetaminophen (TYLENOL) tablet 975 mg (975 mg Oral Given 8/20/23 0153)   BENZOCAINE (DENTAL) 20 % swab 1 Application (1 Application Oral Given 9/95/69 0156)   oxyCODONE (ROXICODONE) IR tablet 5 mg (5 mg Oral Given 8/20/23 0153)   penicillin V potassium (VEETID) tablet 500 mg (500 mg Oral Given 8/20/23 0153)       Diagnostic Studies  Results Reviewed     None                 No orders to display              Procedures  Procedures         ED Course                                             Medical Decision Making  Patient presented to ED with complaint of mouth pain. Patient reports he was seeing his dentist for dental work however reportedly needs teeth pulled which is since is unable to do. Patient has not yet followed up with anybody for this. Reports has been dealing with this pain for some time however over the last few days pain has been increasing. Only took ibuprofen today without any significant relief. No other pain medications PTA. No other significant complaints and reports otherwise been well. PE findings as outlined in the PE section. DDx including but not limited to: dental cristhian, dental infection. Doubt dental abscess, dry socket, gingivitis,lorena's angina. We will treat patient's pain with Toradol, acetaminophen, lollicaine and oxycodone. Patient will not be driving home from the ED tonight. We will place an ambulatory referral to [facility] and have pt f/u ASAP. No complaints of patient or findings on PE to warrant further labs, imaging or work-up at this time. Strict return precautions verbally communicated to the patient as outlined in the AVS.  All patient questions and concerns were answered. Patient verbally communicated their understanding and agreement to the above plan. Patient stable at discharge. Portions of the record may have been created with voice recognition software. Occasional wrong word or "sound a like" substitutions may have occurred due to the inherent limitations of voice recognition software. Read the chart carefully and recognize, using context, where substitutions have occurred. Pain, dental: acute illness or injury  Risk  OTC drugs. Prescription drug management. Disposition  Final diagnoses:   Pain, dental     Time reflects when diagnosis was documented in both MDM as applicable and the Disposition within this note     Time User Action Codes Description Comment    8/20/2023  1:47 AM Jesus Elizabeth Add [K08.89] Pain, dental       ED Disposition     ED Disposition   Discharge    Condition   Stable    Date/Time   Sun Aug 20, 2023  1:47 AM    Comment   Sandhya Tirado discharge to home/self care.                Follow-up Information     Follow up With Specialties Details Why Contact Info [facility] Emergency Medicine Go to  As needed, If symptoms worsen [address] [address]  1307 [facility] Emergency Department, [address] [facility] [place], [place], [address] [place] [address] for Oral and Maxillofacial Surgery JERO  Call in 1 day For further evaluation 949 [facility] [address] KE[address] Schneider [facility].           Discharge Medication List as of 8/20/2023  1:49 AM      START taking these medications    Details   penicillin V potassium (VEETID) 500 mg tablet Take 1 tablet (500 mg total) by mouth 4 (four) times a day for 7 days, Starting Sun 8/20/2023, Until Sun 8/27/2023, Normal         CONTINUE these medications which have NOT CHANGED    Details   acetaminophen (TYLENOL) 500 mg tablet Take 1 tablet (500 mg total) by mouth every 6 (six) hours as needed for mild pain, moderate pain, headaches or fever, Starting Sat 2/9/2019, Print      Cholecalciferol (VITAMIN D3) 1.25 MG (77663 UT) CAPS Take 1 capsule (50,000 Units total) by mouth once a week for 8 doses, Starting Thu 1/23/2020, Until Wed 3/18/2020, Normal      cyclobenzaprine (FLEXERIL) 10 mg tablet Take 1 tablet (10 mg total) by mouth 3 (three) times a day as needed for muscle spasms, Starting Wed 3/18/2020, Normal      diclofenac sodium (VOLTAREN) 50 mg EC tablet Take 1 tablet (50 mg total) by mouth 3 (three) times a day, Starting Wed 3/18/2020, Normal      !! ibuprofen (MOTRIN) 400 mg tablet Take 1 tablet (400 mg total) by mouth every 6 (six) hours as needed for mild pain, moderate pain or fever, Starting Sat 2/9/2019, Print      !! ibuprofen (MOTRIN) 800 mg tablet Take 1 tablet (800 mg total) by mouth every 8 (eight) hours as needed for moderate pain, Starting Mon 1/25/2021, Print       !! - Potential duplicate medications found. Please discuss with provider.               PDMP Review     None          ED Provider  Electronically Signed by           Mary Cabrera PA-C  08/20/23 0758

## 2023-08-31 PROCEDURE — 99283 EMERGENCY DEPT VISIT LOW MDM: CPT

## 2023-09-01 ENCOUNTER — APPOINTMENT (EMERGENCY)
Dept: CT IMAGING | Facility: HOSPITAL | Age: 38
End: 2023-09-01
Payer: COMMERCIAL

## 2023-09-01 ENCOUNTER — HOSPITAL ENCOUNTER (EMERGENCY)
Facility: HOSPITAL | Age: 38
Discharge: HOME | End: 2023-09-01
Attending: EMERGENCY MEDICINE
Payer: COMMERCIAL

## 2023-09-01 VITALS
SYSTOLIC BLOOD PRESSURE: 124 MMHG | BODY MASS INDEX: 33.43 KG/M2 | HEART RATE: 76 BPM | DIASTOLIC BLOOD PRESSURE: 91 MMHG | OXYGEN SATURATION: 96 % | WEIGHT: 188.71 LBS | RESPIRATION RATE: 20 BRPM | TEMPERATURE: 98.7 F

## 2023-09-01 DIAGNOSIS — K08.89 DENTALGIA: Primary | ICD-10-CM

## 2023-09-01 LAB
ANION GAP SERPL CALCULATED.3IONS-SCNC: 5 MMOL/L
BASOPHILS # BLD MANUAL: 0 THOUSAND/UL (ref 0–0.1)
BASOPHILS NFR MAR MANUAL: 0 % (ref 0–1)
BUN SERPL-MCNC: 17 MG/DL (ref 5–25)
CALCIUM SERPL-MCNC: 9 MG/DL (ref 8.4–10.2)
CHLORIDE SERPL-SCNC: 102 MMOL/L (ref 96–108)
CO2 SERPL-SCNC: 29 MMOL/L (ref 21–32)
CREAT SERPL-MCNC: 0.83 MG/DL (ref 0.6–1.3)
EOSINOPHIL # BLD MANUAL: 0.09 THOUSAND/UL (ref 0–0.4)
EOSINOPHIL NFR BLD MANUAL: 1 % (ref 0–6)
ERYTHROCYTE [DISTWIDTH] IN BLOOD BY AUTOMATED COUNT: 13.2 % (ref 11.6–15.1)
GFR SERPL CREATININE-BSD FRML MDRD: 110 ML/MIN/1.73SQ M
GLUCOSE SERPL-MCNC: 117 MG/DL (ref 65–140)
HCT VFR BLD AUTO: 37.7 % (ref 36.5–49.3)
HGB BLD-MCNC: 12.2 G/DL (ref 12–17)
LYMPHOCYTES # BLD AUTO: 4.69 THOUSAND/UL (ref 0.6–4.47)
LYMPHOCYTES # BLD AUTO: 50 % (ref 14–44)
MCH RBC QN AUTO: 28 PG (ref 26.8–34.3)
MCHC RBC AUTO-ENTMCNC: 32.4 G/DL (ref 31.4–37.4)
MCV RBC AUTO: 87 FL (ref 82–98)
MONOCYTES # BLD AUTO: 0.19 THOUSAND/UL (ref 0–1.22)
MONOCYTES NFR BLD: 2 % (ref 4–12)
NEUTROPHILS # BLD MANUAL: 4.41 THOUSAND/UL (ref 1.85–7.62)
NEUTS SEG NFR BLD AUTO: 47 % (ref 43–75)
PLATELET # BLD AUTO: 200 THOUSANDS/UL (ref 149–390)
PLATELET BLD QL SMEAR: ADEQUATE
PMV BLD AUTO: 8.4 FL (ref 8.9–12.7)
POTASSIUM SERPL-SCNC: 4 MMOL/L (ref 3.5–5.3)
RBC # BLD AUTO: 4.36 MILLION/UL (ref 3.88–5.62)
RBC MORPH BLD: NORMAL
SODIUM SERPL-SCNC: 136 MMOL/L (ref 135–147)
WBC # BLD AUTO: 9.38 THOUSAND/UL (ref 4.31–10.16)

## 2023-09-01 PROCEDURE — 70491 CT SOFT TISSUE NECK W/DYE: CPT

## 2023-09-01 PROCEDURE — 36415 COLL VENOUS BLD VENIPUNCTURE: CPT

## 2023-09-01 PROCEDURE — 85007 BL SMEAR W/DIFF WBC COUNT: CPT

## 2023-09-01 PROCEDURE — 96374 THER/PROPH/DIAG INJ IV PUSH: CPT

## 2023-09-01 PROCEDURE — G1004 CDSM NDSC: HCPCS

## 2023-09-01 PROCEDURE — 99285 EMERGENCY DEPT VISIT HI MDM: CPT | Performed by: EMERGENCY MEDICINE

## 2023-09-01 PROCEDURE — 85027 COMPLETE CBC AUTOMATED: CPT

## 2023-09-01 PROCEDURE — 80048 BASIC METABOLIC PNL TOTAL CA: CPT

## 2023-09-01 RX ORDER — LIDOCAINE HYDROCHLORIDE 20 MG/ML
15 SOLUTION OROPHARYNGEAL 4 TIMES DAILY PRN
Status: DISCONTINUED | OUTPATIENT
Start: 2023-09-01 | End: 2023-09-01 | Stop reason: HOSPADM

## 2023-09-01 RX ORDER — AMOXICILLIN AND CLAVULANATE POTASSIUM 875; 125 MG/1; MG/1
1 TABLET, FILM COATED ORAL EVERY 12 HOURS
Qty: 14 TABLET | Refills: 0 | Status: SHIPPED | OUTPATIENT
Start: 2023-09-01 | End: 2023-09-08

## 2023-09-01 RX ORDER — NAPROXEN 500 MG/1
500 TABLET ORAL 2 TIMES DAILY WITH MEALS
Qty: 30 TABLET | Refills: 0 | Status: SHIPPED | OUTPATIENT
Start: 2023-09-01

## 2023-09-01 RX ORDER — ACETAMINOPHEN 500 MG
500 TABLET ORAL EVERY 6 HOURS PRN
Qty: 30 TABLET | Refills: 0 | Status: SHIPPED | OUTPATIENT
Start: 2023-09-01

## 2023-09-01 RX ORDER — AMOXICILLIN AND CLAVULANATE POTASSIUM 875; 125 MG/1; MG/1
1 TABLET, FILM COATED ORAL ONCE
Status: COMPLETED | OUTPATIENT
Start: 2023-09-01 | End: 2023-09-01

## 2023-09-01 RX ORDER — KETOROLAC TROMETHAMINE 30 MG/ML
15 INJECTION, SOLUTION INTRAMUSCULAR; INTRAVENOUS ONCE
Status: COMPLETED | OUTPATIENT
Start: 2023-09-01 | End: 2023-09-01

## 2023-09-01 RX ORDER — LIDOCAINE HYDROCHLORIDE 20 MG/ML
15 SOLUTION OROPHARYNGEAL 4 TIMES DAILY PRN
Qty: 100 ML | Refills: 0 | Status: SHIPPED | OUTPATIENT
Start: 2023-09-01

## 2023-09-01 RX ADMIN — Medication 15 ML: at 03:21

## 2023-09-01 RX ADMIN — KETOROLAC TROMETHAMINE 15 MG: 30 INJECTION, SOLUTION INTRAMUSCULAR; INTRAVENOUS at 01:06

## 2023-09-01 RX ADMIN — AMOXICILLIN AND CLAVULANATE POTASSIUM 1 TABLET: 875; 125 TABLET, FILM COATED ORAL at 03:20

## 2023-09-01 RX ADMIN — IOHEXOL 85 ML: 350 INJECTION, SOLUTION INTRAVENOUS at 02:13

## 2023-09-02 NOTE — ED PROVIDER NOTES
History  Chief Complaint   Patient presents with   • Dental Pain     Pt reports left sided dental pain for several weeks. Pt states was sent to a oral surgeon appt 10/4/2023. Pt states feels pain down into neck. The patient is a 30-year-old male who presents to the ED for evaluation of left-sided dental pain. He reports it has been present for several weeks, and he was recently put on penicillin on 8/20 which he completed. He reports that initially improved his symptoms, however they returned and have worsened. He reports that he feels as though the pain has radiated into the area below his chin, and he feels as though his anterior neck is swollen. He did follow-up with [facility], but does not have a procedure scheduled until October. He otherwise denies fever, chills, facial numbness, vision changes, dysphagia, chest pain, dyspnea. Prior to Admission Medications   Prescriptions Last Dose Informant Patient Reported? Taking?    Cholecalciferol (VITAMIN D3) 1.25 MG (50750 UT) CAPS   No No   Sig: Take 1 capsule (50,000 Units total) by mouth once a week for 8 doses   acetaminophen (TYLENOL) 500 mg tablet Not Taking  No No   Sig: Take 1 tablet (500 mg total) by mouth every 6 (six) hours as needed for mild pain, moderate pain, headaches or fever   Patient not taking: Reported on 9/1/2023   cyclobenzaprine (FLEXERIL) 10 mg tablet Not Taking  No No   Sig: Take 1 tablet (10 mg total) by mouth 3 (three) times a day as needed for muscle spasms   Patient not taking: Reported on 9/1/2023   diclofenac sodium (VOLTAREN) 50 mg EC tablet Not Taking  No No   Sig: Take 1 tablet (50 mg total) by mouth 3 (three) times a day   Patient not taking: Reported on 9/1/2023   ibuprofen (MOTRIN) 400 mg tablet Not Taking  No No   Sig: Take 1 tablet (400 mg total) by mouth every 6 (six) hours as needed for mild pain, moderate pain or fever   Patient not taking: Reported on 9/1/2023   ibuprofen (MOTRIN) 800 mg tablet Not Taking  No No Sig: Take 1 tablet (800 mg total) by mouth every 8 (eight) hours as needed for moderate pain   Patient not taking: Reported on 9/1/2023      Facility-Administered Medications: None       Past Medical History:   Diagnosis Date   • Arthritis    • Carpal tunnel syndrome    • Chronic back pain        Past Surgical History:   Procedure Laterality Date   • CARPAL TUNNEL RELEASE         History reviewed. No pertinent family history. I have reviewed and agree with the history as documented. E-Cigarette/Vaping     E-Cigarette/Vaping Substances     Social History     Tobacco Use   • Smoking status: Every Day     Packs/day: 0.50     Types: Cigarettes   • Smokeless tobacco: Never   Substance Use Topics   • Alcohol use: No   • Drug use: No       Review of Systems   Constitutional: Negative for chills and fever. HENT: Positive for dental problem and facial swelling. Negative for congestion, ear pain and rhinorrhea. Respiratory: Negative for cough and shortness of breath. Cardiovascular: Negative for chest pain and leg swelling. Gastrointestinal: Negative for abdominal pain, nausea and vomiting. Musculoskeletal: Positive for neck pain. Negative for arthralgias, myalgias and neck stiffness. Skin: Negative for rash and wound. Neurological: Negative for dizziness, weakness, numbness and headaches. Physical Exam  Physical Exam  Vitals and nursing note reviewed. Constitutional:       General: He is not in acute distress. Appearance: He is well-developed. He is not ill-appearing. HENT:      Head: Normocephalic and atraumatic. Jaw: No trismus. Comments: Overall very poor dentition throughout the mouth. Patient with likely multiple dental caries and multiple dental erosions. Patient with TTP over all 4 quadrants of the mouth, worst in the left lower area. No gingival swelling, abscess or discharge in the mouth. No lesions or sores. MMM and oropharynx otherwise patent and clear.   Uvula midline. No induration or TTP below the tongue. Patient does have TTP submentally, no swelling appreciated on exam. No overlying skin changes. Mouth/Throat:      Mouth: No angioedema. Dentition: Abnormal dentition. Dental tenderness present. Eyes:      Conjunctiva/sclera: Conjunctivae normal.   Cardiovascular:      Rate and Rhythm: Normal rate and regular rhythm. Heart sounds: No murmur heard. Pulmonary:      Effort: Pulmonary effort is normal. No respiratory distress. Breath sounds: No stridor. Abdominal:      General: Abdomen is flat. Musculoskeletal:         General: No swelling. Cervical back: Neck supple. No rigidity. Skin:     General: Skin is warm and dry. Capillary Refill: Capillary refill takes less than 2 seconds. Neurological:      Mental Status: He is alert.    Psychiatric:         Mood and Affect: Mood normal.         Vital Signs  ED Triage Vitals [08/31/23 2335]   Temperature Pulse Respirations Blood Pressure SpO2   98.7 °F (37.1 °C) 66 18 140/62 99 %      Temp Source Heart Rate Source Patient Position - Orthostatic VS BP Location FiO2 (%)   Oral Monitor Sitting Right arm --      Pain Score       10 - Worst Possible Pain           Vitals:    08/31/23 2335 09/01/23 0110 09/01/23 0326   BP: 140/62 142/81 124/91   Pulse: 66 60 76   Patient Position - Orthostatic VS: Sitting Sitting Sitting         Visual Acuity      ED Medications  Medications   ketorolac (TORADOL) injection 15 mg (15 mg Intravenous Given 9/1/23 0106)   iohexol (OMNIPAQUE) 350 MG/ML injection (MULTI-DOSE) 85 mL (85 mL Intravenous Given 9/1/23 0213)   amoxicillin-clavulanate (AUGMENTIN) 875-125 mg per tablet 1 tablet (1 tablet Oral Given 9/1/23 0320)       Diagnostic Studies  Results Reviewed     Procedure Component Value Units Date/Time    Manual Differential(PHLEBS Do Not Order) [[id number]]  (Abnormal) Collected: 09/01/23 0106    Lab Status: Final result Specimen: Blood from Arm, Right Updated: 09/01/23 0218     Segmented % 47 %      Lymphocytes % 50 %      Monocytes % 2 %      Eosinophils, % 1 %      Basophils % 0 %      Absolute Neutrophils 4.41 Thousand/uL      Lymphocytes Absolute 4.69 Thousand/uL      Monocytes Absolute 0.19 Thousand/uL      Eosinophils Absolute 0.09 Thousand/uL      Basophils Absolute 0.00 Thousand/uL      Total Counted --     RBC Morphology Normal     Platelet Estimate Adequate    Basic metabolic panel [[id number]] Collected: 09/01/23 0106    Lab Status: Final result Specimen: Blood from Arm, Right Updated: 09/01/23 0139     Sodium 136 mmol/L      Potassium 4.0 mmol/L      Chloride 102 mmol/L      CO2 29 mmol/L      ANION GAP 5 mmol/L      BUN 17 mg/dL      Creatinine 0.83 mg/dL      Glucose 117 mg/dL      Calcium 9.0 mg/dL      eGFR 110 ml/min/1.73sq m     Narrative:      [place] guidelines for Chronic Kidney Disease (CKD):   •  Stage 1 with normal or high GFR (GFR > 90 mL/min/1.73 square meters)  •  Stage 2 Mild CKD (GFR = 60-89 mL/min/1.73 square meters)  •  Stage 3A Moderate CKD (GFR = 45-59 mL/min/1.73 square meters)  •  Stage 3B Moderate CKD (GFR = 30-44 mL/min/1.73 square meters)  •  Stage 4 Severe CKD (GFR = 15-29 mL/min/1.73 square meters)  •  Stage 5 End Stage CKD (GFR <15 mL/min/1.73 square meters)  Note: GFR calculation is accurate only with a steady state creatinine    CBC and differential [[id number]]  (Abnormal) Collected: 09/01/23 0106    Lab Status: Final result Specimen: Blood from Arm, Right Updated: 09/01/23 0126     WBC 9.38 Thousand/uL      RBC 4.36 Million/uL      Hemoglobin 12.2 g/dL      Hematocrit 37.7 %      MCV 87 fL      MCH 28.0 pg      MCHC 32.4 g/dL      RDW 13.2 %      MPV 8.4 fL      Platelets 827 Thousands/uL                  CT soft tissue neck with contrast   Final Result by Dorcas Thao MD (09/01 0308)      Multiple dental carious lesions and probable periodontal disease as above overall worsened compared to prior study dated 1/25/2021. No significant soft tissue cellulitic changes or discrete enhancing collection to suggest odontogenic abscess within    limitations of artifact. Dental follow-up advised. Workstation performed: [id number]                    Procedures  Procedures         ED Course  ED Course as of 09/02/23 0510   Fri Sep 01, 2023   0123 WBC: 9.38   0312 CT soft tissue neck with contrast  IMPRESSION:     Multiple dental carious lesions and probable periodontal disease as above overall worsened compared to prior study dated 1/25/2021. No significant soft tissue cellulitic changes or discrete enhancing collection to suggest odontogenic abscess within   limitations of artifact. Dental follow-up advised.          SBIRT 20yo+    Flowsheet Row Most Recent Value   Initial Alcohol Screen: US AUDIT-C     1. How often do you have a drink containing alcohol? 0 Filed at: 09/01/2023 0322   2. How many drinks containing alcohol do you have on a typical day you are drinking? 0 Filed at: 09/01/2023 0322   3a. Male UNDER 65: How often do you have five or more drinks on one occasion? 0 Filed at: 09/01/2023 0322   3b. FEMALE Any Age, or MALE 65+: How often do you have 4 or more drinks on one occassion? 0 Filed at: 09/01/2023 0322   Audit-C Score 0 Filed at: 09/01/2023 9324   [place]: How many times in the past year have you. .. Used an illegal drug or used a prescription medication for non-medical reasons? Never Filed at: 09/01/2023 7199                    Medical Decision Making  DDx including but not limited to: dental cristhian, dental infection, dental abscess, dry socket, gingivitis; doubt lorena's angina. Given patient's concern for submental swelling and pain, and worsening symptoms despite penicillin, will obtain CT soft tissue neck. Will obtain CBC to evaluate for leukocytosis, anemia. Will obtain BMP to evaluate kidney function, for electrolyte disturbance. CT without acute abnormality.   Labs grossly unremarkable. Will prescribe Augmentin given worsening symptoms, tenderness, erythema  after penicillin. At the time of discharge, the patient is in no acute distress. I discussed with the patient the diagnosis, treatment plan, follow-up, return precautions, and discharge instructions; they were given the opportunity to ask questions and verbalized understanding. Dentalgia: acute illness or injury  Amount and/or Complexity of Data Reviewed  External Data Reviewed: labs and notes. Labs: ordered. Decision-making details documented in ED Course. Radiology: ordered. Decision-making details documented in ED Course. Risk  OTC drugs. Prescription drug management. Disposition  Final diagnoses:   Karyn Zuleta     Time reflects when diagnosis was documented in both MDM as applicable and the Disposition within this note     Time User Action Codes Description Comment    9/1/2023  3:14 AM Rolando Bourgeois [K08.89] Karyn Zuleta       ED Disposition     ED Disposition   Discharge    Condition   Stable    Date/Time   Fri Sep 1, 2023 [address] Fox discharge to home/self care.                Follow-up Information     Follow up With Specialties Details Why [address] [place] [address] for Oral and Maxillofacial Surgery [facility] [address] [facility]          Discharge Medication List as of 9/1/2023  3:18 AM      START taking these medications    Details   !! acetaminophen (TYLENOL) 500 mg tablet Take 1 tablet (500 mg total) by mouth every 6 (six) hours as needed for mild pain or moderate pain, Starting Fri 9/1/2023, Normal      amoxicillin-clavulanate (AUGMENTIN) 875-125 mg per tablet Take 1 tablet by mouth every 12 (twelve) hours for 7 days, Starting Fri 9/1/2023, Until Fri 9/8/2023, Normal      Lidocaine Viscous HCl (XYLOCAINE) 2 % mucosal solution Swish and spit 15 mL 4 (four) times a day as needed for mouth pain or discomfort, Starting Fri 9/1/2023, Normal      naproxen (Naprosyn) 500 mg tablet Take 1 tablet (500 mg total) by mouth 2 (two) times a day with meals, Starting Fri 9/1/2023, Normal       !! - Potential duplicate medications found. Please discuss with provider. CONTINUE these medications which have NOT CHANGED    Details   !! acetaminophen (TYLENOL) 500 mg tablet Take 1 tablet (500 mg total) by mouth every 6 (six) hours as needed for mild pain, moderate pain, headaches or fever, Starting Sat 2/9/2019, Print      Cholecalciferol (VITAMIN D3) 1.25 MG (64312 UT) CAPS Take 1 capsule (50,000 Units total) by mouth once a week for 8 doses, Starting Thu 1/23/2020, Until Wed 3/18/2020, Normal      cyclobenzaprine (FLEXERIL) 10 mg tablet Take 1 tablet (10 mg total) by mouth 3 (three) times a day as needed for muscle spasms, Starting Wed 3/18/2020, Normal      diclofenac sodium (VOLTAREN) 50 mg EC tablet Take 1 tablet (50 mg total) by mouth 3 (three) times a day, Starting Wed 3/18/2020, Normal      !! ibuprofen (MOTRIN) 400 mg tablet Take 1 tablet (400 mg total) by mouth every 6 (six) hours as needed for mild pain, moderate pain or fever, Starting Sat 2/9/2019, Print      !! ibuprofen (MOTRIN) 800 mg tablet Take 1 tablet (800 mg total) by mouth every 8 (eight) hours as needed for moderate pain, Starting Mon 1/25/2021, Print       !! - Potential duplicate medications found. Please discuss with provider. No discharge procedures on file.     PDMP Review     None          ED Provider  Electronically Signed by           Annika Bell PA-C  09/02/23 2050

## 2023-09-30 PROCEDURE — 99282 EMERGENCY DEPT VISIT SF MDM: CPT

## 2023-10-01 ENCOUNTER — HOSPITAL ENCOUNTER (EMERGENCY)
Facility: HOSPITAL | Age: 38
Discharge: HOME | End: 2023-10-01
Attending: EMERGENCY MEDICINE
Payer: COMMERCIAL

## 2023-10-01 VITALS
SYSTOLIC BLOOD PRESSURE: 128 MMHG | RESPIRATION RATE: 18 BRPM | HEART RATE: 63 BPM | OXYGEN SATURATION: 99 % | WEIGHT: 199.08 LBS | BODY MASS INDEX: 35.26 KG/M2 | TEMPERATURE: 98 F | DIASTOLIC BLOOD PRESSURE: 80 MMHG

## 2023-10-01 DIAGNOSIS — K08.89 PAIN, DENTAL: Primary | ICD-10-CM

## 2023-10-01 PROCEDURE — 96372 THER/PROPH/DIAG INJ SC/IM: CPT

## 2023-10-01 PROCEDURE — 99284 EMERGENCY DEPT VISIT MOD MDM: CPT

## 2023-10-01 RX ORDER — LIDOCAINE HYDROCHLORIDE 20 MG/ML
15 SOLUTION OROPHARYNGEAL 4 TIMES DAILY PRN
Status: DISCONTINUED | OUTPATIENT
Start: 2023-10-01 | End: 2023-10-01 | Stop reason: HOSPADM

## 2023-10-01 RX ORDER — PENICILLIN V POTASSIUM 500 MG/1
500 TABLET ORAL 4 TIMES DAILY
Qty: 28 TABLET | Refills: 0 | Status: SHIPPED | OUTPATIENT
Start: 2023-10-01 | End: 2023-10-08

## 2023-10-01 RX ORDER — OXYCODONE HYDROCHLORIDE 5 MG/1
5 TABLET ORAL ONCE
Status: DISCONTINUED | OUTPATIENT
Start: 2023-10-01 | End: 2023-10-01

## 2023-10-01 RX ORDER — KETOROLAC TROMETHAMINE 30 MG/ML
15 INJECTION, SOLUTION INTRAMUSCULAR; INTRAVENOUS ONCE
Status: COMPLETED | OUTPATIENT
Start: 2023-10-01 | End: 2023-10-01

## 2023-10-01 RX ORDER — ACETAMINOPHEN 325 MG/1
975 TABLET ORAL ONCE
Status: COMPLETED | OUTPATIENT
Start: 2023-10-01 | End: 2023-10-01

## 2023-10-01 RX ORDER — PENICILLIN V POTASSIUM 250 MG/1
500 TABLET ORAL ONCE
Status: COMPLETED | OUTPATIENT
Start: 2023-10-01 | End: 2023-10-01

## 2023-10-01 RX ADMIN — BENZOCAINE 1 APPLICATION: 220 GEL, DENTIFRICE DENTAL at 03:06

## 2023-10-01 RX ADMIN — ACETAMINOPHEN 325MG 975 MG: 325 TABLET ORAL at 03:01

## 2023-10-01 RX ADMIN — KETOROLAC TROMETHAMINE 15 MG: 30 INJECTION, SOLUTION INTRAMUSCULAR; INTRAVENOUS at 03:02

## 2023-10-01 RX ADMIN — PENICILLIN V POTASSIUM 500 MG: 250 TABLET, FILM COATED ORAL at 03:01

## 2023-10-01 RX ADMIN — Medication 15 ML: at 03:09

## 2023-10-01 NOTE — ED PROVIDER NOTES
History  Chief Complaint   Patient presents with   • Facial Swelling     Patient reports dental problem on left side of face, unable to sleep, patient has dentist appointment 10/4 but could not tolerate the pain, swelling noted     50-year-old male presenting to the ED with complaints of dental pain on the left lower side of his mouth since yesterday. Reports he has been having this pain for some time however has recently followed up with [facility] and was advised he needs 6 teeth pulled. Is scheduled for these teeth to be extracted on 10/4/2023. Reports the pain has otherwise been controlled until yesterday when the pain became exacerbated again. No known inciting event. No injury or trauma to the mouth. States his left side of his jaw does feel slightly swollen. No discharge into the mouth. No pain medications PTA. Reports he has otherwise been well and denies any other specific complaints today. Specifically denies fever, chills, cough, CP, SOB, palpitations, abdominal pain, N/V/D, neck pain/stiffness, headache, tongue swelling, throat swelling, difficulty swallowing, drooling, sore throat, confusion, weakness and any other complaint. Prior to Admission Medications   Prescriptions Last Dose Informant Patient Reported? Taking?    Cholecalciferol (VITAMIN D3) 1.25 MG (38454 UT) CAPS   No No   Sig: Take 1 capsule (50,000 Units total) by mouth once a week for 8 doses   Lidocaine Viscous HCl (XYLOCAINE) 2 % mucosal solution   No No   Sig: Swish and spit 15 mL 4 (four) times a day as needed for mouth pain or discomfort   acetaminophen (TYLENOL) 500 mg tablet   No No   Sig: Take 1 tablet (500 mg total) by mouth every 6 (six) hours as needed for mild pain, moderate pain, headaches or fever   Patient not taking: Reported on 9/1/2023   acetaminophen (TYLENOL) 500 mg tablet   No No   Sig: Take 1 tablet (500 mg total) by mouth every 6 (six) hours as needed for mild pain or moderate pain   cyclobenzaprine (FLEXERIL) 10 mg tablet   No No   Sig: Take 1 tablet (10 mg total) by mouth 3 (three) times a day as needed for muscle spasms   Patient not taking: Reported on 9/1/2023   diclofenac sodium (VOLTAREN) 50 mg EC tablet   No No   Sig: Take 1 tablet (50 mg total) by mouth 3 (three) times a day   Patient not taking: Reported on 9/1/2023   ibuprofen (MOTRIN) 400 mg tablet   No No   Sig: Take 1 tablet (400 mg total) by mouth every 6 (six) hours as needed for mild pain, moderate pain or fever   Patient not taking: Reported on 9/1/2023   ibuprofen (MOTRIN) 800 mg tablet   No No   Sig: Take 1 tablet (800 mg total) by mouth every 8 (eight) hours as needed for moderate pain   Patient not taking: Reported on 9/1/2023   naproxen (Naprosyn) 500 mg tablet   No No   Sig: Take 1 tablet (500 mg total) by mouth 2 (two) times a day with meals      Facility-Administered Medications: None       Past Medical History:   Diagnosis Date   • Arthritis    • Carpal tunnel syndrome    • Chronic back pain        Past Surgical History:   Procedure Laterality Date   • CARPAL TUNNEL RELEASE         No family history on file. I have reviewed and agree with the history as documented. E-Cigarette/Vaping     E-Cigarette/Vaping Substances     Social History     Tobacco Use   • Smoking status: Every Day     Packs/day: 0.50     Types: Cigarettes   • Smokeless tobacco: Never   Substance Use Topics   • Alcohol use: No   • Drug use: No       Review of Systems   HENT: Positive for dental problem and facial swelling. All other systems reviewed and are negative. Physical Exam  Physical Exam  Vitals and nursing note reviewed. Constitutional:       General: He is not in acute distress. Appearance: He is well-developed. Comments: Awake, alert, interactive and resting in the stretcher in no acute distress. Patient is not ill or toxic appearing. HENT:      Head: Normocephalic and atraumatic. Mouth/Throat:      Comments: MMM.  Overall poor dentition throughout the mouth. Patient with TTP over the left lower side of his mouth. Slight swelling over the gingiva however no erythema, discharge or fluctuant mass. No periapical abscess. No TTP below the tongue or in the submandibular area. Oropharynx otherwise patent and clear. Eyes:      Conjunctiva/sclera: Conjunctivae normal.   Cardiovascular:      Rate and Rhythm: Normal rate and regular rhythm. Heart sounds: No murmur heard. Pulmonary:      Effort: Pulmonary effort is normal. No respiratory distress. Breath sounds: Normal breath sounds. Abdominal:      Palpations: Abdomen is soft. Tenderness: There is no abdominal tenderness. Musculoskeletal:         General: No swelling. Cervical back: Neck supple. No rigidity. Lymphadenopathy:      Cervical: No cervical adenopathy. Skin:     General: Skin is warm and dry. Capillary Refill: Capillary refill takes less than 2 seconds. Neurological:      Mental Status: He is alert.    Psychiatric:         Mood and Affect: Mood normal.         Vital Signs  ED Triage Vitals   Temperature Pulse Respirations Blood Pressure SpO2   09/30/23 2334 09/30/23 2334 09/30/23 2334 09/30/23 2334 09/30/23 2334   98 °F (36.7 °C) 72 18 160/70 99 %      Temp Source Heart Rate Source Patient Position - Orthostatic VS BP Location FiO2 (%)   09/30/23 2334 09/30/23 2334 09/30/23 2334 09/30/23 2334 --   Oral Monitor Sitting Right arm       Pain Score       09/30/23 2335       10 - Worst Possible Pain           Vitals:    09/30/23 2334 10/01/23 0308   BP: 160/70 128/80   Pulse: 72 63   Patient Position - Orthostatic VS: Sitting Lying         Visual Acuity      ED Medications  Medications   ketorolac (TORADOL) injection 15 mg (15 mg Intramuscular Given 10/1/23 0302)   acetaminophen (TYLENOL) tablet 975 mg (975 mg Oral Given 10/1/23 0301)   penicillin V potassium (VEETID) tablet 500 mg (500 mg Oral Given 10/1/23 0301)   BENZOCAINE (DENTAL) 20 % swab 1 Application (1 Application Oral Given 79/4/11 0306)       Diagnostic Studies  Results Reviewed     None                 No orders to display              Procedures  Procedures         ED Course                               SBIRT 22yo+    Flowsheet Row Most Recent Value   Initial Alcohol Screen: US AUDIT-C     1. How often do you have a drink containing alcohol? 0 Filed at: 09/30/2023 2335   2. How many drinks containing alcohol do you have on a typical day you are drinking? 0 Filed at: 09/30/2023 2335   3a. Male UNDER 65: How often do you have five or more drinks on one occasion? 0 Filed at: 09/30/2023 2335   3b. FEMALE Any Age, or MALE 65+: How often do you have 4 or more drinks on one occassion? 0 Filed at: 09/30/2023 2335   Audit-C Score 0 Filed at: 09/30/2023 2335   HADLEY: How many times in the past year have you. .. Used an illegal drug or used a prescription medication for non-medical reasons? Never Filed at: 09/30/2023 2335                    Medical Decision Making  Patient presented to ED with complaints of dental pain. Has recently followed up with [facility], was advised he needs 6 teeth extracted, is scheduled for 10/4. Reports pain became exaggerated yesterday again with no known inciting event. No pain medications PTA. Also reports slight swelling over the area. No discharge into the mouth. No other specific complaints. Otherwise maintaining p.o. intake and secretions. PE findings as outlined in the PE section. No signs of periapical abscess or other abscesses throughout the mouth. Does have slight swelling however no other concerning findings. Oropharynx otherwise patent and clear. No signs of Patrick's. We will provide patient with pain control and start patient on antibiotics in light of possible early dental infection. Advised importance of following up at his scheduled appoint with [facility] for the tooth to be extracted. Patient verbalized under standing and agreement.   Do not believe patient requires further labs, imaging or work-up at this time. Strict return precautions verbally communicated to the patient as outlined in the AVS.  All patient questions and concerns were answered. Patient verbally communicated their understanding and agreement to the above plan. Patient stable at discharge. Portions of the record may have been created with voice recognition software. Occasional wrong word or "sound a like" substitutions may have occurred due to the inherent limitations of voice recognition software. Read the chart carefully and recognize, using context, where substitutions have occurred. Risk  OTC drugs. Prescription drug management. Disposition  Final diagnoses:   Pain, dental     Time reflects when diagnosis was documented in both MDM as applicable and the Disposition within this note     Time User Action Codes Description Comment    10/1/2023  2:53 AM Yue Gaytan Bk [K08.89] Pain, dental       ED Disposition     ED Disposition   Discharge    Condition   Stable    Date/Time   Sun Oct 1, 2023  2:53 AM    Comment   Edith Jackman discharge to home/self care.                Follow-up Information     Follow up With Specialties Details Why [address] [facility] Emergency Medicine Go to  As needed, If symptoms worsen [address] [phone]  [facility] Emergency Department, [address] [place] [address], [id number]          Discharge Medication List as of 10/1/2023  2:54 AM      START taking these medications    Details   penicillin V potassium (VEETID) 500 mg tablet Take 1 tablet (500 mg total) by mouth 4 (four) times a day for 7 days, Starting Sun 10/1/2023, Until Sun 10/8/2023, Normal         CONTINUE these medications which have NOT CHANGED    Details   !! acetaminophen (TYLENOL) 500 mg tablet Take 1 tablet (500 mg total) by mouth every 6 (six) hours as needed for mild pain, moderate pain, headaches or fever, Starting Sat 2/9/2019, Print      !! acetaminophen (TYLENOL) 500 mg tablet Take 1 tablet (500 mg total) by mouth every 6 (six) hours as needed for mild pain or moderate pain, Starting Fri 9/1/2023, Normal      Cholecalciferol (VITAMIN D3) 1.25 MG (35241 UT) CAPS Take 1 capsule (50,000 Units total) by mouth once a week for 8 doses, Starting Thu 1/23/2020, Until Wed 3/18/2020, Normal      cyclobenzaprine (FLEXERIL) 10 mg tablet Take 1 tablet (10 mg total) by mouth 3 (three) times a day as needed for muscle spasms, Starting Wed 3/18/2020, Normal      diclofenac sodium (VOLTAREN) 50 mg EC tablet Take 1 tablet (50 mg total) by mouth 3 (three) times a day, Starting Wed 3/18/2020, Normal      !! ibuprofen (MOTRIN) 400 mg tablet Take 1 tablet (400 mg total) by mouth every 6 (six) hours as needed for mild pain, moderate pain or fever, Starting Sat 2/9/2019, Print      !! ibuprofen (MOTRIN) 800 mg tablet Take 1 tablet (800 mg total) by mouth every 8 (eight) hours as needed for moderate pain, Starting Mon 1/25/2021, Print      Lidocaine Viscous HCl (XYLOCAINE) 2 % mucosal solution Swish and spit 15 mL 4 (four) times a day as needed for mouth pain or discomfort, Starting Fri 9/1/2023, Normal      naproxen (Naprosyn) 500 mg tablet Take 1 tablet (500 mg total) by mouth 2 (two) times a day with meals, Starting Fri 9/1/2023, Normal       !! - Potential duplicate medications found. Please discuss with provider. No discharge procedures on file.     PDMP Review     None          ED Provider  Electronically Signed by           Portillo Ivan PA-C  10/01/23 6197

## 2023-10-01 NOTE — DISCHARGE INSTRUCTIONS
Please return to the emergency department for any concerns as outlined in the after visit summary or for any other concerns. Please follow-up with your primary care provider and [facility] in 2 days for re-evaluation and further management. Continue ibuprofen and acetaminophen for pain control. Consider over-the-counter Orajel or similar if you see benefit. Continue the penicillin antibiotics as prescribed. Continue to stay well-hydrated.

## 2023-10-06 ENCOUNTER — HOSPITAL ENCOUNTER (EMERGENCY)
Facility: HOSPITAL | Age: 38
Discharge: HOME | End: 2023-10-07
Attending: EMERGENCY MEDICINE
Payer: COMMERCIAL

## 2023-10-06 ENCOUNTER — APPOINTMENT (EMERGENCY)
Dept: CT IMAGING | Facility: HOSPITAL | Age: 38
End: 2023-10-06
Payer: COMMERCIAL

## 2023-10-06 VITALS
HEART RATE: 75 BPM | DIASTOLIC BLOOD PRESSURE: 63 MMHG | SYSTOLIC BLOOD PRESSURE: 130 MMHG | BODY MASS INDEX: 34.17 KG/M2 | RESPIRATION RATE: 18 BRPM | WEIGHT: 192.9 LBS | OXYGEN SATURATION: 97 %

## 2023-10-06 DIAGNOSIS — K08.89 PAIN, DENTAL: Primary | ICD-10-CM

## 2023-10-06 LAB
ALBUMIN SERPL BCP-MCNC: 4.2 G/DL (ref 3.5–5)
ALP SERPL-CCNC: 70 U/L (ref 34–104)
ALT SERPL W P-5'-P-CCNC: 16 U/L (ref 7–52)
ANION GAP SERPL CALCULATED.3IONS-SCNC: 7 MMOL/L
AST SERPL W P-5'-P-CCNC: 15 U/L (ref 13–39)
BASOPHILS # BLD MANUAL: 0 THOUSAND/UL (ref 0–0.1)
BASOPHILS NFR MAR MANUAL: 0 % (ref 0–1)
BILIRUB SERPL-MCNC: 0.44 MG/DL (ref 0.2–1)
BUN SERPL-MCNC: 17 MG/DL (ref 5–25)
CALCIUM SERPL-MCNC: 8.9 MG/DL (ref 8.4–10.2)
CHLORIDE SERPL-SCNC: 99 MMOL/L (ref 96–108)
CO2 SERPL-SCNC: 27 MMOL/L (ref 21–32)
CREAT SERPL-MCNC: 0.77 MG/DL (ref 0.6–1.3)
EOSINOPHIL # BLD MANUAL: 0.2 THOUSAND/UL (ref 0–0.4)
EOSINOPHIL NFR BLD MANUAL: 2 % (ref 0–6)
ERYTHROCYTE [DISTWIDTH] IN BLOOD BY AUTOMATED COUNT: 12.7 % (ref 11.6–15.1)
GFR SERPL CREATININE-BSD FRML MDRD: 114 ML/MIN/1.73SQ M
GLUCOSE SERPL-MCNC: 85 MG/DL (ref 65–140)
HCT VFR BLD AUTO: 39.1 % (ref 36.5–49.3)
HGB BLD-MCNC: 12.8 G/DL (ref 12–17)
LYMPHOCYTES # BLD AUTO: 3.95 THOUSAND/UL (ref 0.6–4.47)
LYMPHOCYTES # BLD AUTO: 39 % (ref 14–44)
MCH RBC QN AUTO: 27.7 PG (ref 26.8–34.3)
MCHC RBC AUTO-ENTMCNC: 32.7 G/DL (ref 31.4–37.4)
MCV RBC AUTO: 85 FL (ref 82–98)
MONOCYTES # BLD AUTO: 0.91 THOUSAND/UL (ref 0–1.22)
MONOCYTES NFR BLD: 9 % (ref 4–12)
NEUTROPHILS # BLD MANUAL: 5.06 THOUSAND/UL (ref 1.85–7.62)
NEUTS SEG NFR BLD AUTO: 50 % (ref 43–75)
PLATELET # BLD AUTO: 220 THOUSANDS/UL (ref 149–390)
PLATELET BLD QL SMEAR: ADEQUATE
PMV BLD AUTO: 8.3 FL (ref 8.9–12.7)
POTASSIUM SERPL-SCNC: 4 MMOL/L (ref 3.5–5.3)
PROT SERPL-MCNC: 6.9 G/DL (ref 6.4–8.4)
RBC # BLD AUTO: 4.62 MILLION/UL (ref 3.88–5.62)
RBC MORPH BLD: NORMAL
SODIUM SERPL-SCNC: 133 MMOL/L (ref 135–147)
WBC # BLD AUTO: 10.12 THOUSAND/UL (ref 4.31–10.16)

## 2023-10-06 PROCEDURE — G1004 CDSM NDSC: HCPCS

## 2023-10-06 PROCEDURE — 99284 EMERGENCY DEPT VISIT MOD MDM: CPT | Performed by: PHYSICIAN ASSISTANT

## 2023-10-06 PROCEDURE — 99283 EMERGENCY DEPT VISIT LOW MDM: CPT

## 2023-10-06 PROCEDURE — 70491 CT SOFT TISSUE NECK W/DYE: CPT

## 2023-10-06 PROCEDURE — 85027 COMPLETE CBC AUTOMATED: CPT | Performed by: PHYSICIAN ASSISTANT

## 2023-10-06 PROCEDURE — 96374 THER/PROPH/DIAG INJ IV PUSH: CPT

## 2023-10-06 PROCEDURE — 85007 BL SMEAR W/DIFF WBC COUNT: CPT | Performed by: PHYSICIAN ASSISTANT

## 2023-10-06 PROCEDURE — 80053 COMPREHEN METABOLIC PANEL: CPT | Performed by: PHYSICIAN ASSISTANT

## 2023-10-06 PROCEDURE — 36415 COLL VENOUS BLD VENIPUNCTURE: CPT | Performed by: PHYSICIAN ASSISTANT

## 2023-10-06 RX ORDER — HYDROMORPHONE HCL/PF 1 MG/ML
0.5 SYRINGE (ML) INJECTION ONCE
Status: COMPLETED | OUTPATIENT
Start: 2023-10-06 | End: 2023-10-06

## 2023-10-06 RX ADMIN — IOHEXOL 85 ML: 350 INJECTION, SOLUTION INTRAVENOUS at 23:17

## 2023-10-06 RX ADMIN — HYDROMORPHONE HYDROCHLORIDE 0.5 MG: 1 INJECTION, SOLUTION INTRAMUSCULAR; INTRAVENOUS; SUBCUTANEOUS at 22:30

## 2023-10-07 PROCEDURE — 96375 TX/PRO/DX INJ NEW DRUG ADDON: CPT

## 2023-10-07 RX ORDER — OXYCODONE HYDROCHLORIDE AND ACETAMINOPHEN 5; 325 MG/1; MG/1
1 TABLET ORAL EVERY 6 HOURS PRN
Qty: 8 TABLET | Refills: 0 | Status: SHIPPED | OUTPATIENT
Start: 2023-10-07 | End: 2023-10-09

## 2023-10-07 RX ORDER — OXYCODONE HYDROCHLORIDE AND ACETAMINOPHEN 5; 325 MG/1; MG/1
1 TABLET ORAL ONCE
Status: COMPLETED | OUTPATIENT
Start: 2023-10-07 | End: 2023-10-07

## 2023-10-07 RX ORDER — KETOROLAC TROMETHAMINE 30 MG/ML
15 INJECTION, SOLUTION INTRAMUSCULAR; INTRAVENOUS ONCE
Status: COMPLETED | OUTPATIENT
Start: 2023-10-07 | End: 2023-10-07

## 2023-10-07 RX ADMIN — KETOROLAC TROMETHAMINE 15 MG: 30 INJECTION, SOLUTION INTRAMUSCULAR; INTRAVENOUS at 00:12

## 2023-10-07 RX ADMIN — OXYCODONE AND ACETAMINOPHEN 1 TABLET: 5; 325 TABLET ORAL at 00:13

## 2023-10-07 NOTE — ED PROVIDER NOTES
History  Chief Complaint   Patient presents with   • Dental Pain     Patient reports having 7 teeth surgical removed on Wednesday. Pt reports he is having difficulty swallowing due to pain and has 10/10 pain in his mouth. Pt reports right side hurts more when swallowing. Pt given amoxicillin, chlorhexidine mouth wash, and ibuprofen post surgery. Pt reports no bleeding in mouth     Giovanni is a 41yo who presents to the ED today with right jaw/neck pain post teeth removal surgery 2 days ago. Did visit doctor today, and told him everything went well, but patient states motrin isn't helping and he is having a hard time swallowing due to pain. Patient denies fevers, headache, and chest pain. No sob. No other acute complaints. Prior to Admission Medications   Prescriptions Last Dose Informant Patient Reported? Taking?    Cholecalciferol (VITAMIN D3) 1.25 MG (27742 UT) CAPS   No No   Sig: Take 1 capsule (50,000 Units total) by mouth once a week for 8 doses   Lidocaine Viscous HCl (XYLOCAINE) 2 % mucosal solution   No No   Sig: Swish and spit 15 mL 4 (four) times a day as needed for mouth pain or discomfort   acetaminophen (TYLENOL) 500 mg tablet   No No   Sig: Take 1 tablet (500 mg total) by mouth every 6 (six) hours as needed for mild pain, moderate pain, headaches or fever   Patient not taking: Reported on 9/1/2023   acetaminophen (TYLENOL) 500 mg tablet   No No   Sig: Take 1 tablet (500 mg total) by mouth every 6 (six) hours as needed for mild pain or moderate pain   cyclobenzaprine (FLEXERIL) 10 mg tablet   No No   Sig: Take 1 tablet (10 mg total) by mouth 3 (three) times a day as needed for muscle spasms   Patient not taking: Reported on 9/1/2023   diclofenac sodium (VOLTAREN) 50 mg EC tablet   No No   Sig: Take 1 tablet (50 mg total) by mouth 3 (three) times a day   Patient not taking: Reported on 9/1/2023   ibuprofen (MOTRIN) 400 mg tablet   No No   Sig: Take 1 tablet (400 mg total) by mouth every 6 (six) hours as needed for mild pain, moderate pain or fever   Patient not taking: Reported on 9/1/2023   ibuprofen (MOTRIN) 800 mg tablet   No No   Sig: Take 1 tablet (800 mg total) by mouth every 8 (eight) hours as needed for moderate pain   Patient not taking: Reported on 9/1/2023   naproxen (Naprosyn) 500 mg tablet   No No   Sig: Take 1 tablet (500 mg total) by mouth 2 (two) times a day with meals   penicillin V potassium (VEETID) 500 mg tablet   No No   Sig: Take 1 tablet (500 mg total) by mouth 4 (four) times a day for 7 days      Facility-Administered Medications: None       Past Medical History:   Diagnosis Date   • Arthritis    • Carpal tunnel syndrome    • Chronic back pain        Past Surgical History:   Procedure Laterality Date   • CARPAL TUNNEL RELEASE         History reviewed. No pertinent family history. I have reviewed and agree with the history as documented. E-Cigarette/Vaping     E-Cigarette/Vaping Substances     Social History     Tobacco Use   • Smoking status: Former     Packs/day: 0.50     Types: Cigarettes   • Smokeless tobacco: Never   Substance Use Topics   • Alcohol use: No   • Drug use: No       Review of Systems   Constitutional: Negative for fatigue and fever. HENT: Positive for dental problem, ear pain, facial swelling, sore throat and trouble swallowing. Negative for rhinorrhea. Eyes: Negative for visual disturbance. Respiratory: Negative for cough and shortness of breath. Cardiovascular: Negative for chest pain. Gastrointestinal: Negative for abdominal pain, nausea and vomiting. Genitourinary: Negative for dysuria and frequency. Allergic/Immunologic: Negative for immunocompromised state. Neurological: Negative for dizziness, syncope, weakness and headaches. Psychiatric/Behavioral: Negative for behavioral problems and self-injury. All other systems reviewed and are negative. Physical Exam  Physical Exam  Vitals and nursing note reviewed.    Constitutional: General: He is not in acute distress. Appearance: He is well-developed. He is not diaphoretic. HENT:      Head: Normocephalic and atraumatic. Comments: Difficulty evaluating surgical site inside mouth, unable to open fully. Swelling right mandibular region. Tenderness anterior right side of neck, without any swelling. Right Ear: Tympanic membrane, ear canal and external ear normal.      Nose: Nose normal.   Eyes:      Conjunctiva/sclera: Conjunctivae normal.      Pupils: Pupils are equal, round, and reactive to light. Cardiovascular:      Rate and Rhythm: Normal rate and regular rhythm. Heart sounds: Normal heart sounds. Pulmonary:      Effort: Pulmonary effort is normal.      Breath sounds: Normal breath sounds. Abdominal:      General: Bowel sounds are normal.      Palpations: Abdomen is soft. Tenderness: There is no abdominal tenderness. Musculoskeletal:      Cervical back: Normal range of motion and neck supple. Skin:     General: Skin is warm and dry. Neurological:      Mental Status: He is alert and oriented to person, place, and time.    Psychiatric:         Behavior: Behavior normal.         Vital Signs  ED Triage Vitals [10/06/23 2150]   Temp Pulse Respirations Blood Pressure SpO2   -- 75 18 130/63 97 %      Temp src Heart Rate Source Patient Position - Orthostatic VS BP Location FiO2 (%)   -- Monitor Sitting Right arm --      Pain Score       10 - Worst Possible Pain           Vitals:    10/06/23 2150   BP: 130/63   Pulse: 75   Patient Position - Orthostatic VS: Sitting         Visual Acuity      ED Medications  Medications   HYDROmorphone (DILAUDID) injection 0.5 mg (0.5 mg Intravenous Given 10/6/23 2230)   iohexol (OMNIPAQUE) 350 MG/ML injection (MULTI-DOSE) 85 mL (85 mL Intravenous Given 10/6/23 2317)   oxyCODONE-acetaminophen (PERCOCET) 5-325 mg per tablet 1 tablet (1 tablet Oral Given 10/7/23 0013)   ketorolac (TORADOL) injection 15 mg (15 mg Intravenous Given 10/7/23 0012)       Diagnostic Studies  Results Reviewed     Procedure Component Value Units Date/Time    Manual Differential(PHLEBS Do Not Order) [[id number]] Collected: 10/06/23 2229    Lab Status: Final result Specimen: Blood from Arm, Left Updated: 10/06/23 2259     Segmented % 50 %      Lymphocytes % 39 %      Monocytes % 9 %      Eosinophils, % 2 %      Basophils % 0 %      Absolute Neutrophils 5.06 Thousand/uL      Lymphocytes Absolute 3.95 Thousand/uL      Monocytes Absolute 0.91 Thousand/uL      Eosinophils Absolute 0.20 Thousand/uL      Basophils Absolute 0.00 Thousand/uL      Total Counted --     RBC Morphology Normal     Platelet Estimate Adequate    Comprehensive metabolic panel [[id number]]  (Abnormal) Collected: 10/06/23 2229    Lab Status: Final result Specimen: Blood from Arm, Left Updated: 10/06/23 2256     Sodium 133 mmol/L      Potassium 4.0 mmol/L      Chloride 99 mmol/L      CO2 27 mmol/L      ANION GAP 7 mmol/L      BUN 17 mg/dL      Creatinine 0.77 mg/dL      Glucose 85 mg/dL      Calcium 8.9 mg/dL      AST 15 U/L      ALT 16 U/L      Alkaline Phosphatase 70 U/L      Total Protein 6.9 g/dL      Albumin 4.2 g/dL      Total Bilirubin 0.44 mg/dL      eGFR 114 ml/min/1.73sq m     Narrative:      [place] guidelines for Chronic Kidney Disease (CKD):   •  Stage 1 with normal or high GFR (GFR > 90 mL/min/1.73 square meters)  •  Stage 2 Mild CKD (GFR = 60-89 mL/min/1.73 square meters)  •  Stage 3A Moderate CKD (GFR = 45-59 mL/min/1.73 square meters)  •  Stage 3B Moderate CKD (GFR = 30-44 mL/min/1.73 square meters)  •  Stage 4 Severe CKD (GFR = 15-29 mL/min/1.73 square meters)  •  Stage 5 End Stage CKD (GFR <15 mL/min/1.73 square meters)  Note: GFR calculation is accurate only with a steady state creatinine    CBC and differential [[id number]]  (Abnormal) Collected: 10/06/23 2229    Lab Status: Final result Specimen: Blood from Arm, Left Updated: 10/06/23 2242     WBC 10.12 Thousand/uL      RBC 4.62 Million/uL      Hemoglobin 12.8 g/dL      Hematocrit 39.1 %      MCV 85 fL      MCH 27.7 pg      MCHC 32.7 g/dL      RDW 12.7 %      MPV 8.3 fL      Platelets 026 Thousands/uL     Narrative: This is an appended report. These results have been appended to a previously verified report. CT soft tissue neck with contrast   Final Result by Nichelle Arechiga MD (10/07 0044)      Mild edema within the right parapharyngeal fat likely reflecting postprocedural change from patient's recent dental procedure. Workstation performed: [id number]                    Procedures  Procedures         ED Course  ED Course as of 10/07/23 0053   Sat Oct 07, 2023   0045 Reviewed results with patient. Asked to return if pain persists and if needs an admission for pain control. Time will help heal, no infection noted on CT. Asked patient to take percocet with motrin every 6hrs for pain for 2 days and follow up with oral surgeon. 6534 Eating soft foods at home, asked to continue that               pain post ob +reactive lymph nodes          MDM    Disposition  Final diagnoses:   Pain, dental     Time reflects when diagnosis was documented in both MDM as applicable and the Disposition within this note     Time User Action Codes Description Comment    10/7/2023 12:51 AM Yin Suggs Add [K08.89] Pain, dental       ED Disposition     ED Disposition   Discharge    Condition   Stable    Date/Time   Sat Oct 7, 2023 12:51 AM    Comment   Noel Gusman discharge to home/self care.                Follow-up Information     Follow up With Specialties Details Why Contact Info Additional Information    Angelina Ruiz MD Internal Medicine In 1 week  [address] [place] [phone]  [address] [place] [address]. Emergency Department Emergency Medicine  If symptoms worsen [address] [phone] [address] [facility] Emergency Department, [address] [facility], [place], [place], [address]          Patient's Medications   Discharge Prescriptions    OXYCODONE-ACETAMINOPHEN (PERCOCET) 5-325 MG PER TABLET    Take 1 tablet by mouth every 6 (six) hours as needed for moderate pain for up to 2 days Max Daily Amount: 4 tablets       Start Date: 10/7/2023 End Date: 10/9/2023       Order Dose: 1 tablet       Quantity: 8 tablet    Refills: 0       No discharge procedures on file.     PDMP Review     None          ED Provider  Electronically Signed by           Kory Adan PA-C  10/07/23 8618

## 2023-10-07 NOTE — DISCHARGE INSTRUCTIONS
take percocet with motrin every 6hrs for pain for 2 days and follow up with oral surgeon. Continue soft foods.  Return to ed as needed as we discussed

## 2025-02-11 ENCOUNTER — HOSPITAL ENCOUNTER (EMERGENCY)
Facility: HOSPITAL | Age: 40
Discharge: HOME | End: 2025-02-11
Attending: EMERGENCY MEDICINE
Payer: COMMERCIAL

## 2025-02-11 VITALS
OXYGEN SATURATION: 97 % | WEIGHT: 202.82 LBS | BODY MASS INDEX: 35.93 KG/M2 | DIASTOLIC BLOOD PRESSURE: 57 MMHG | TEMPERATURE: 98.5 F | HEART RATE: 74 BPM | SYSTOLIC BLOOD PRESSURE: 115 MMHG | RESPIRATION RATE: 20 BRPM

## 2025-02-11 DIAGNOSIS — K29.70 GASTRITIS: Primary | ICD-10-CM

## 2025-02-11 DIAGNOSIS — R10.9 ABDOMINAL PAIN: ICD-10-CM

## 2025-02-11 LAB
ALBUMIN SERPL BCG-MCNC: 4.5 G/DL (ref 3.5–5)
ALP SERPL-CCNC: 73 U/L (ref 34–104)
ALT SERPL W P-5'-P-CCNC: 20 U/L (ref 7–52)
ANION GAP SERPL CALCULATED.3IONS-SCNC: 10 MMOL/L (ref 4–13)
AST SERPL W P-5'-P-CCNC: 20 U/L (ref 13–39)
BASOPHILS # BLD AUTO: 0.05 THOUSANDS/ΜL (ref 0–0.1)
BASOPHILS NFR BLD AUTO: 1 % (ref 0–1)
BILIRUB SERPL-MCNC: 0.26 MG/DL (ref 0.2–1)
BUN SERPL-MCNC: 16 MG/DL (ref 5–25)
CALCIUM SERPL-MCNC: 9.4 MG/DL (ref 8.4–10.2)
CHLORIDE SERPL-SCNC: 101 MMOL/L (ref 96–108)
CO2 SERPL-SCNC: 29 MMOL/L (ref 21–32)
CREAT SERPL-MCNC: 0.88 MG/DL (ref 0.6–1.3)
EOSINOPHIL # BLD AUTO: 0.22 THOUSAND/ΜL (ref 0–0.61)
EOSINOPHIL NFR BLD AUTO: 2 % (ref 0–6)
ERYTHROCYTE [DISTWIDTH] IN BLOOD BY AUTOMATED COUNT: 13.2 % (ref 11.6–15.1)
GFR SERPL CREATININE-BSD FRML MDRD: 107 ML/MIN/1.73SQ M
GLUCOSE SERPL-MCNC: 110 MG/DL (ref 65–140)
HCT VFR BLD AUTO: 41.3 % (ref 36.5–49.3)
HGB BLD-MCNC: 13.6 G/DL (ref 12–17)
IMM GRANULOCYTES # BLD AUTO: 0.03 THOUSAND/UL (ref 0–0.2)
IMM GRANULOCYTES NFR BLD AUTO: 0 % (ref 0–2)
LIPASE SERPL-CCNC: 16 U/L (ref 11–82)
LYMPHOCYTES # BLD AUTO: 3.78 THOUSANDS/ΜL (ref 0.6–4.47)
LYMPHOCYTES NFR BLD AUTO: 35 % (ref 14–44)
MCH RBC QN AUTO: 27.5 PG (ref 26.8–34.3)
MCHC RBC AUTO-ENTMCNC: 32.9 G/DL (ref 31.4–37.4)
MCV RBC AUTO: 84 FL (ref 82–98)
MONOCYTES # BLD AUTO: 0.73 THOUSAND/ΜL (ref 0.17–1.22)
MONOCYTES NFR BLD AUTO: 7 % (ref 4–12)
NEUTROPHILS # BLD AUTO: 5.9 THOUSANDS/ΜL (ref 1.85–7.62)
NEUTS SEG NFR BLD AUTO: 55 % (ref 43–75)
NRBC BLD AUTO-RTO: 0 /100 WBCS
PLATELET # BLD AUTO: 228 THOUSANDS/UL (ref 149–390)
PMV BLD AUTO: 9.2 FL (ref 8.9–12.7)
POTASSIUM SERPL-SCNC: 3.9 MMOL/L (ref 3.5–5.3)
PROT SERPL-MCNC: 7.3 G/DL (ref 6.4–8.4)
RBC # BLD AUTO: 4.94 MILLION/UL (ref 3.88–5.62)
SODIUM SERPL-SCNC: 140 MMOL/L (ref 135–147)
WBC # BLD AUTO: 10.71 THOUSAND/UL (ref 4.31–10.16)

## 2025-02-11 PROCEDURE — 80053 COMPREHEN METABOLIC PANEL: CPT | Performed by: EMERGENCY MEDICINE

## 2025-02-11 PROCEDURE — 99283 EMERGENCY DEPT VISIT LOW MDM: CPT

## 2025-02-11 PROCEDURE — 83690 ASSAY OF LIPASE: CPT | Performed by: EMERGENCY MEDICINE

## 2025-02-11 PROCEDURE — 96367 TX/PROPH/DG ADDL SEQ IV INF: CPT

## 2025-02-11 PROCEDURE — 96365 THER/PROPH/DIAG IV INF INIT: CPT

## 2025-02-11 PROCEDURE — 85025 COMPLETE CBC W/AUTO DIFF WBC: CPT | Performed by: EMERGENCY MEDICINE

## 2025-02-11 PROCEDURE — 36415 COLL VENOUS BLD VENIPUNCTURE: CPT | Performed by: EMERGENCY MEDICINE

## 2025-02-11 PROCEDURE — 96375 TX/PRO/DX INJ NEW DRUG ADDON: CPT

## 2025-02-11 PROCEDURE — 99284 EMERGENCY DEPT VISIT MOD MDM: CPT | Performed by: EMERGENCY MEDICINE

## 2025-02-11 RX ORDER — FAMOTIDINE 20 MG/1
20 TABLET, FILM COATED ORAL 2 TIMES DAILY PRN
Qty: 30 TABLET | Refills: 0 | Status: SHIPPED | OUTPATIENT
Start: 2025-02-11

## 2025-02-11 RX ORDER — ONDANSETRON 2 MG/ML
4 INJECTION INTRAMUSCULAR; INTRAVENOUS ONCE
Status: COMPLETED | OUTPATIENT
Start: 2025-02-11 | End: 2025-02-11

## 2025-02-11 RX ORDER — KETOROLAC TROMETHAMINE 30 MG/ML
15 INJECTION, SOLUTION INTRAMUSCULAR; INTRAVENOUS ONCE
Status: COMPLETED | OUTPATIENT
Start: 2025-02-11 | End: 2025-02-11

## 2025-02-11 RX ORDER — ACETAMINOPHEN 10 MG/ML
1000 INJECTION, SOLUTION INTRAVENOUS ONCE
Status: COMPLETED | OUTPATIENT
Start: 2025-02-11 | End: 2025-02-11

## 2025-02-11 RX ORDER — MAGNESIUM HYDROXIDE/ALUMINUM HYDROXICE/SIMETHICONE 120; 1200; 1200 MG/30ML; MG/30ML; MG/30ML
30 SUSPENSION ORAL ONCE
Status: COMPLETED | OUTPATIENT
Start: 2025-02-11 | End: 2025-02-11

## 2025-02-11 RX ORDER — FAMOTIDINE 10 MG/ML
20 INJECTION, SOLUTION INTRAVENOUS ONCE
Status: COMPLETED | OUTPATIENT
Start: 2025-02-11 | End: 2025-02-11

## 2025-02-11 RX ORDER — ALUMINUM HYDROXIDE, MAGNESIUM HYDROXIDE, SIMETHICONE 400; 400; 40 MG/10ML; MG/10ML; MG/10ML
30 SUSPENSION ORAL 4 TIMES DAILY PRN
Qty: 769 ML | Refills: 0 | Status: SHIPPED | OUTPATIENT
Start: 2025-02-11

## 2025-02-11 RX ORDER — ONDANSETRON 4 MG/1
4 TABLET, FILM COATED ORAL EVERY 6 HOURS
Qty: 12 TABLET | Refills: 0 | Status: SHIPPED | OUTPATIENT
Start: 2025-02-11

## 2025-02-11 RX ADMIN — ACETAMINOPHEN 1000 MG: 10 INJECTION INTRAVENOUS at 02:57

## 2025-02-11 RX ADMIN — SODIUM CHLORIDE, SODIUM LACTATE, POTASSIUM CHLORIDE, AND CALCIUM CHLORIDE 1000 ML: .6; .31; .03; .02 INJECTION, SOLUTION INTRAVENOUS at 01:03

## 2025-02-11 RX ADMIN — FAMOTIDINE 20 MG: 10 INJECTION, SOLUTION INTRAVENOUS at 00:58

## 2025-02-11 RX ADMIN — ONDANSETRON 4 MG: 2 INJECTION INTRAMUSCULAR; INTRAVENOUS at 00:58

## 2025-02-11 RX ADMIN — KETOROLAC TROMETHAMINE 15 MG: 30 INJECTION, SOLUTION INTRAMUSCULAR; INTRAVENOUS at 00:58

## 2025-02-11 RX ADMIN — ALUMINUM HYDROXIDE, MAGNESIUM HYDROXIDE, AND SIMETHICONE 30 ML: 200; 200; 20 SUSPENSION ORAL at 00:58

## 2025-02-11 NOTE — ED PROVIDER NOTES
Time reflects when diagnosis was documented in both MDM as applicable and the Disposition within this note       Time User Action Codes Description Comment    2/11/2025  3:24 AM Spike Barton [K29.70] Gastritis     2/11/2025  3:24 AM Spike Barton [R10.9] Abdominal pain           ED Disposition       ED Disposition   Discharge    Condition   Stable    Date/Time   Tue Feb 11, 2025  3:23 AM    Comment   Giovanni Fox discharge to home/self care.                   Assessment & Plan       Medical Decision Making  - Patient is very uncomfortable on exam.  Rebound and guarding in the entire upper abdomen.  Concern for potential gastritis or gallbladder pathology at this time.     -  No prior history of abdominal surgeries that would lead to complications such as bowel obstruction, volvulus, etc.  No vascular history, hypertension, Marfan's or other risk factors for dissection or AAA.  No testicular pain to suggest torsion.  No skin changes to suggest shingles.  No known history of familial diverticulosis or polyposis disease.  No prior EGD or colonoscopy.  No modifying factors from foods to suggest possible allergen or intolerance.      - given the presentation, will check CBC for marked leukocytosis  - CMP for liver enzyme elevation that could signal cholecystitis, biliary obstructive disease. Check RFTs for EMILIA / markers of dehydration.  - Lipase given abdominal pain to evaluate specifically for pancreatitis.  - Will obtain a RUQ U/S for possible GB pathology  - If no improvement after medications and ultrasound is negative then we will consider a CT AP w Contrast: r/o appendicitis, cholecystitis, bowel obstruction or other acute abdominal pathology. Would also demonstrate signs of pyelonephritis, cystitis.   - Treatment with IV Zofran, Toradol, Pepcid and p.o. Maalox.  Will hydrate with IV fluids.  - Disposition per workup.     2:04 AM  Labs are normal.  Patient feeling better.  Will start to  "p.o. challenge.  Ultrasound not done at this time but since patient is feeling better I do not feel that we would need imaging.  If he fails p.o. trial then we will obtain a CT scan since we are unable to get an ultrasound at this time.    2:52 AM  Patient reevaluated.  P.O. challenge passed but patient still having epigastric discomfort.  Will give IV Tylenol and obtain a CT scan at this time.    3:26 AM  Patient now states she feels 100% better.  Has no pain at this time.  States he does not feel that he needs for CT scan.  With good clinical improvement and reassuring labs I feel that it is reasonable to discharge at this time.  Most likely cause of this would be gastritis from food ingestion.  Will write him prescriptions for Zofran, Maalox and Pepcid.  While him follow-up with gastroenterology if symptoms are persisting with strict return ER precautions.  Did advise the patient to avoid irritating foods for the next several days to decrease his gastric inflammation.    Prior to discharge, discharge instructions were discussed with patient at bedside. Patient was provided both verbal and written instructions. Patient is understanding of the discharge instructions and is agreeable to plan of care. Return precautions were discussed with patient bedside, patient verbalized understanding of signs and symptoms that would necessitate return to the ED. All questions were answered. Patient was comfortable with the plan of care and discharged to home.     Portions of this chart may have been written with voice recognition software.  Occasional grammatical errors, wrong word or \"sound a like\" substitutions may have occurred due to software limitations.  Please read carefully and use context to recognize where substitutions have occurred.       Amount and/or Complexity of Data Reviewed  Labs: ordered. Decision-making details documented in ED Course.  Radiology: ordered.    Risk  OTC drugs.  Prescription drug " management.        ED Course as of 02/11/25 0327 Tue Feb 11, 2025   0151 Lipase  Normal    0151 Comprehensive metabolic panel  Normal    0151 CBC and differential(!)  Nonspecific mild leukocytosis       Medications   lactated ringers bolus 1,000 mL (0 mL Intravenous Stopped 2/11/25 0207)   ondansetron (ZOFRAN) injection 4 mg (4 mg Intravenous Given 2/11/25 0058)   ketorolac (TORADOL) injection 15 mg (15 mg Intravenous Given 2/11/25 0058)   Famotidine (PF) (PEPCID) injection 20 mg (20 mg Intravenous Given 2/11/25 0058)   aluminum-magnesium hydroxide-simethicone (MAALOX) oral suspension 30 mL (30 mL Oral Given 2/11/25 0058)   acetaminophen (Ofirmev) injection 1,000 mg (0 mg Intravenous Stopped 2/11/25 0325)       ED Risk Strat Scores                          SBIRT 22yo+      Flowsheet Row Most Recent Value   Initial Alcohol Screen: US AUDIT-C     1. How often do you have a drink containing alcohol? 1 Filed at: 02/11/2025 0042   2. How many drinks containing alcohol do you have on a typical day you are drinking?  0 Filed at: 02/11/2025 0042   3a. Male UNDER 65: How often do you have five or more drinks on one occasion? 0 Filed at: 02/11/2025 0042   3b. FEMALE Any Age, or MALE 65+: How often do you have 4 or more drinks on one occassion? 0 Filed at: 02/11/2025 0042   Audit-C Score 1 Filed at: 02/11/2025 0042   HADLEY: How many times in the past year have you...    Used an illegal drug or used a prescription medication for non-medical reasons? Never Filed at: 02/11/2025 0042                            History of Present Illness       Chief Complaint   Patient presents with    Abdominal Pain     States LUQ pain starting about 1 hr prior to arrival. States feels like a burning sensation. Also complaining of nausea.        Past Medical History:   Diagnosis Date    Arthritis     Carpal tunnel syndrome     Chronic back pain       Past Surgical History:   Procedure Laterality Date    CARPAL TUNNEL RELEASE        History  reviewed. No pertinent family history.   Social History     Tobacco Use    Smoking status: Every Day     Current packs/day: 0.50     Types: Cigarettes    Smokeless tobacco: Never   Substance Use Topics    Alcohol use: No    Drug use: No      E-Cigarette/Vaping      E-Cigarette/Vaping Substances      I have reviewed and agree with the history as documented.     Patient is a 39-year-old male who presents for upper abdominal pain that started about 1 hour prior to arrival shortly after eating chicken.  He states that he has eaten food like this before.  No known history of gallbladder pathology, gastritis or acid reflux.  Denies any history of abdominal surgery.  Denies taking any medications and has no history of diabetes.      History provided by:  Patient   used: No    Abdominal Pain  Pain location:  Epigastric, RUQ and LUQ  Associated symptoms: nausea    Associated symptoms: no chest pain, no chills, no cough, no diarrhea, no fever, no shortness of breath and no vomiting        Review of Systems   Constitutional:  Negative for chills and fever.   Respiratory:  Negative for cough, chest tightness and shortness of breath.    Cardiovascular:  Negative for chest pain and leg swelling.   Gastrointestinal:  Positive for abdominal pain and nausea. Negative for diarrhea and vomiting.   Musculoskeletal:  Negative for back pain and neck pain.   Skin:  Negative for color change, pallor, rash and wound.   Allergic/Immunologic: Negative for immunocompromised state.   Neurological:  Negative for dizziness, syncope and light-headedness.   All other systems reviewed and are negative.          Objective       ED Triage Vitals [02/11/25 0037]   Temperature Pulse Blood Pressure Respirations SpO2 Patient Position - Orthostatic VS   98.5 °F (36.9 °C) 82 (!) 175/98 20 100 % Lying      Temp Source Heart Rate Source BP Location FiO2 (%) Pain Score    Oral Monitor Right arm -- 10 - Worst Possible Pain      Vitals       Date and Time Temp Pulse SpO2 Resp BP Pain Score FACES Pain Rating User   02/11/25 0200 -- 74 97 % 20 115/57 -- --    02/11/25 0151 -- -- -- -- -- 5 --    02/11/25 0130 -- 70 98 % 20 123/63 -- --    02/11/25 0100 -- 73 96 % 20 148/68 -- --    02/11/25 0058 -- -- -- -- -- 10 - Worst Possible Pain --    02/11/25 0037 98.5 °F (36.9 °C) 82 100 % 20 175/98 10 - Worst Possible Pain -- RC            Physical Exam  Vitals and nursing note reviewed.   Constitutional:       General: He is in acute distress.      Appearance: He is well-developed. He is not ill-appearing, toxic-appearing or diaphoretic.      Comments: Writhing around in pain   HENT:      Head: Normocephalic and atraumatic.      Right Ear: External ear normal.      Left Ear: External ear normal.      Nose: No congestion.   Eyes:      General: No scleral icterus.     Conjunctiva/sclera: Conjunctivae normal.      Right eye: Right conjunctiva is not injected.      Left eye: Left conjunctiva is not injected.   Neck:      Trachea: No tracheal deviation.   Cardiovascular:      Rate and Rhythm: Normal rate.      Pulses: Normal pulses.   Pulmonary:      Effort: Pulmonary effort is normal. No respiratory distress.      Breath sounds: No stridor.   Abdominal:      General: There is no distension.      Palpations: Abdomen is soft.      Tenderness: There is abdominal tenderness in the right upper quadrant, epigastric area and left upper quadrant.   Skin:     General: Skin is warm and dry.      Capillary Refill: Capillary refill takes less than 2 seconds.      Coloration: Skin is not pale.      Findings: No erythema or rash.   Neurological:      Mental Status: He is alert and oriented to person, place, and time.      Motor: No abnormal muscle tone.   Psychiatric:         Mood and Affect: Mood normal.         Behavior: Behavior normal.         Results Reviewed       Procedure Component Value Units Date/Time    Comprehensive metabolic panel [[id number]] Collected:  02/11/25 0105    Lab Status: Final result Specimen: Blood from Arm, Left Updated: 02/11/25 0124     Sodium 140 mmol/L      Potassium 3.9 mmol/L      Chloride 101 mmol/L      CO2 29 mmol/L      ANION GAP 10 mmol/L      BUN 16 mg/dL      Creatinine 0.88 mg/dL      Glucose 110 mg/dL      Calcium 9.4 mg/dL      AST 20 U/L      ALT 20 U/L      Alkaline Phosphatase 73 U/L      Total Protein 7.3 g/dL      Albumin 4.5 g/dL      Total Bilirubin 0.26 mg/dL      eGFR 107 ml/min/1.73sq m     Narrative:      [employer] guidelines for Chronic Kidney Disease (CKD):     Stage 1 with normal or high GFR (GFR > 90 mL/min/1.73 square meters)    Stage 2 Mild CKD (GFR = 60-89 mL/min/1.73 square meters)    Stage 3A Moderate CKD (GFR = 45-59 mL/min/1.73 square meters)    Stage 3B Moderate CKD (GFR = 30-44 mL/min/1.73 square meters)    Stage 4 Severe CKD (GFR = 15-29 mL/min/1.73 square meters)    Stage 5 End Stage CKD (GFR <15 mL/min/1.73 square meters)  Note: GFR calculation is accurate only with a steady state creatinine    Lipase [[id number]]  (Normal) Collected: 02/11/25 0105    Lab Status: Final result Specimen: Blood from Arm, Left Updated: 02/11/25 0124     Lipase 16 u/L     CBC and differential [[id number]]  (Abnormal) Collected: 02/11/25 0105    Lab Status: Final result Specimen: Blood from Arm, Left Updated: 02/11/25 0111     WBC 10.71 Thousand/uL      RBC 4.94 Million/uL      Hemoglobin 13.6 g/dL      Hematocrit 41.3 %      MCV 84 fL      MCH 27.5 pg      MCHC 32.9 g/dL      RDW 13.2 %      MPV 9.2 fL      Platelets 228 Thousands/uL      nRBC 0 /100 WBCs      Segmented % 55 %      Immature Grans % 0 %      Lymphocytes % 35 %      Monocytes % 7 %      Eosinophils Relative 2 %      Basophils Relative 1 %      Absolute Neutrophils 5.90 Thousands/µL      Absolute Immature Grans 0.03 Thousand/uL      Absolute Lymphocytes 3.78 Thousands/µL      Absolute Monocytes 0.73 Thousand/µL      Eosinophils Absolute 0.22  Thousand/µL      Basophils Absolute 0.05 Thousands/µL             US right upper quadrant    (Results Pending)       Procedures    ED Medication and Procedure Management   Prior to Admission Medications   Prescriptions Last Dose Informant Patient Reported? Taking?   Cholecalciferol (VITAMIN D3) 1.25 MG (93923 UT) CAPS   No No   Sig: Take 1 capsule (50,000 Units total) by mouth once a week for 8 doses   Lidocaine Viscous HCl (XYLOCAINE) 2 % mucosal solution   No No   Sig: Swish and spit 15 mL 4 (four) times a day as needed for mouth pain or discomfort   acetaminophen (TYLENOL) 500 mg tablet   No No   Sig: Take 1 tablet (500 mg total) by mouth every 6 (six) hours as needed for mild pain, moderate pain, headaches or fever   Patient not taking: Reported on 9/1/2023   acetaminophen (TYLENOL) 500 mg tablet   No No   Sig: Take 1 tablet (500 mg total) by mouth every 6 (six) hours as needed for mild pain or moderate pain   cyclobenzaprine (FLEXERIL) 10 mg tablet   No No   Sig: Take 1 tablet (10 mg total) by mouth 3 (three) times a day as needed for muscle spasms   Patient not taking: Reported on 9/1/2023   diclofenac sodium (VOLTAREN) 50 mg EC tablet   No No   Sig: Take 1 tablet (50 mg total) by mouth 3 (three) times a day   Patient not taking: Reported on 9/1/2023   ibuprofen (MOTRIN) 400 mg tablet   No No   Sig: Take 1 tablet (400 mg total) by mouth every 6 (six) hours as needed for mild pain, moderate pain or fever   Patient not taking: Reported on 9/1/2023   ibuprofen (MOTRIN) 800 mg tablet   No No   Sig: Take 1 tablet (800 mg total) by mouth every 8 (eight) hours as needed for moderate pain   Patient not taking: Reported on 9/1/2023   naproxen (Naprosyn) 500 mg tablet   No No   Sig: Take 1 tablet (500 mg total) by mouth 2 (two) times a day with meals      Facility-Administered Medications: None     Current Discharge Medication List        START taking these medications    Details   aluminum-magnesium  hydroxide-simethicone (MAALOX) 200-200-20 MG/5ML SUSP Take 30 mL by mouth 4 (four) times a day as needed for heartburn, indigestion or cramping  Qty: 769 mL, Refills: 0    Associated Diagnoses: Gastritis; Abdominal pain      famotidine (PEPCID) 20 mg tablet Take 1 tablet (20 mg total) by mouth 2 (two) times a day as needed for heartburn or indigestion  Qty: 30 tablet, Refills: 0    Associated Diagnoses: Gastritis; Abdominal pain      ondansetron (ZOFRAN) 4 mg tablet Take 1 tablet (4 mg total) by mouth every 6 (six) hours  Qty: 12 tablet, Refills: 0    Associated Diagnoses: Gastritis; Abdominal pain           CONTINUE these medications which have NOT CHANGED    Details   !! acetaminophen (TYLENOL) 500 mg tablet Take 1 tablet (500 mg total) by mouth every 6 (six) hours as needed for mild pain, moderate pain, headaches or fever  Qty: 30 tablet, Refills: 0    Associated Diagnoses: Acute exacerbation of chronic low back pain      !! acetaminophen (TYLENOL) 500 mg tablet Take 1 tablet (500 mg total) by mouth every 6 (six) hours as needed for mild pain or moderate pain  Qty: 30 tablet, Refills: 0    Associated Diagnoses: Dentalgia      Cholecalciferol (VITAMIN D3) 1.25 MG (76033 UT) CAPS Take 1 capsule (50,000 Units total) by mouth once a week for 8 doses  Qty: 8 capsule, Refills: 0    Associated Diagnoses: Vitamin D deficiency      cyclobenzaprine (FLEXERIL) 10 mg tablet Take 1 tablet (10 mg total) by mouth 3 (three) times a day as needed for muscle spasms  Qty: 30 tablet, Refills: 1    Associated Diagnoses: Chronic bilateral low back pain with bilateral sciatica      diclofenac sodium (VOLTAREN) 50 mg EC tablet Take 1 tablet (50 mg total) by mouth 3 (three) times a day  Qty: 90 tablet, Refills: 0    Associated Diagnoses: Chronic bilateral low back pain with bilateral sciatica      !! ibuprofen (MOTRIN) 400 mg tablet Take 1 tablet (400 mg total) by mouth every 6 (six) hours as needed for mild pain, moderate pain or  fever  Qty: 30 tablet, Refills: 0    Associated Diagnoses: Acute exacerbation of chronic low back pain      !! ibuprofen (MOTRIN) 800 mg tablet Take 1 tablet (800 mg total) by mouth every 8 (eight) hours as needed for moderate pain  Qty: 21 tablet, Refills: 0    Associated Diagnoses: Cellulitis      Lidocaine Viscous HCl (XYLOCAINE) 2 % mucosal solution Swish and spit 15 mL 4 (four) times a day as needed for mouth pain or discomfort  Qty: 100 mL, Refills: 0    Associated Diagnoses: Dentalgia      naproxen (Naprosyn) 500 mg tablet Take 1 tablet (500 mg total) by mouth 2 (two) times a day with meals  Qty: 30 tablet, Refills: 0    Associated Diagnoses: Dentalgia       !! - Potential duplicate medications found. Please discuss with provider.        No discharge procedures on file.  ED SEPSIS DOCUMENTATION   Time reflects when diagnosis was documented in both MDM as applicable and the Disposition within this note       Time User Action Codes Description Comment    2/11/2025  3:24 AM Spike Barton [K29.70] Gastritis     2/11/2025  3:24 AM Spike Barton [R10.9] Abdominal pain                  Spike Barton Jr., DO  02/11/25 0256       Spike Barton Jr., DO  02/11/25 0327

## 2025-02-11 NOTE — ED NOTES
Patient given water and crackers for PO challenge at this time      Kimberly Mims RN  02/11/25 6425

## 2025-02-11 NOTE — DISCHARGE INSTRUCTIONS
Please avoid irritant foods such as spicy foods, fried foods, caffeine, chocolate for the next 48 hours.    Please use zofran as needed for any further nausea and vomiting.     Call your PCP's office if symptoms persist or worsen or come back to the ER if you are having a high fever, dehydration, marked weakness, fainting, increased abdominal pain, blood in stool or vomit.

## 2025-06-19 ENCOUNTER — HOSPITAL ENCOUNTER (EMERGENCY)
Facility: HOSPITAL | Age: 40
Discharge: HOME | End: 2025-06-19
Attending: EMERGENCY MEDICINE
Payer: COMMERCIAL

## 2025-06-19 VITALS
RESPIRATION RATE: 18 BRPM | WEIGHT: 201.28 LBS | HEART RATE: 97 BPM | SYSTOLIC BLOOD PRESSURE: 158 MMHG | DIASTOLIC BLOOD PRESSURE: 78 MMHG | OXYGEN SATURATION: 98 % | BODY MASS INDEX: 35.66 KG/M2 | TEMPERATURE: 97.9 F

## 2025-06-19 DIAGNOSIS — F11.20 METHADONE DEPENDENCE (HCC): Primary | ICD-10-CM

## 2025-06-19 PROCEDURE — 99284 EMERGENCY DEPT VISIT MOD MDM: CPT | Performed by: EMERGENCY MEDICINE

## 2025-06-19 PROCEDURE — 99281 EMR DPT VST MAYX REQ PHY/QHP: CPT

## 2025-06-19 RX ORDER — METHADONE HYDROCHLORIDE 10 MG/ML
84 CONCENTRATE ORAL ONCE
Refills: 0 | Status: COMPLETED | OUTPATIENT
Start: 2025-06-19 | End: 2025-06-19

## 2025-06-19 RX ADMIN — METHADONE HYDROCHLORIDE 84 MG: 10 CONCENTRATE ORAL at 07:26

## 2025-06-19 NOTE — ED PROVIDER NOTES
Time reflects when diagnosis was documented in both MDM as applicable and the Disposition within this note       Time User Action Codes Description Comment    6/19/2025  7:00 AM Dayanara Bo Add [F11.20] Methadone dependence (HCC)           ED Disposition       ED Disposition   Discharge    Condition   Stable    Date/Time   Thu Jun 19, 2025  6:54 AM    Comment   Giovanni Fox discharge to home/self care.                   Assessment & Plan       Medical Decision Making  40 yo M presenting for Methadone dose.   Confirmed dose with bottle in room and provided dose for today    Problems Addressed:  Methadone dependence (HCC): chronic illness or injury    Risk  Prescription drug management.        ED Course as of 06/19/25 0747   Thu Jun 19, 2025   0722 Pt gets Methadone from [facility] ([phone]). They are closed today due to holiday. Attempted to call several times and with multiple prompts/left message, but no answer/call back. Pt has bottle with him confirmed last dispensed 1 week ago (6/12) and dose of 84 mg, picture taken and placed in chart.       Medications   methadone (DOLOPHINE) oral concentrated solution 84 mg (84 mg Oral Given 6/19/25 0726)       ED Risk Strat Scores                   Clinical Opiate Withdrawal Scale       Row Name 06/19/25 0652                Pulse 97  -EG        Resting Pulse Rate: Measured After Patient is Sitting or Lying for One Minute 1  -EG        GI Upset: Over Last Half Hour 1  -EG        Sweating: Over Past Half Hour Not Accounted for by Room Temperature of Patient Activity 0  -EG        Tremor: Observation of Outstretched Hands 2  -EG        Restlessness: Observation During Assessment 3  -EG        Yawning: Observation During Assessment 0  -EG        Pupil Size 0  -EG        Anxiety and Irritability 2  -EG        Bone or Joint Aches: If Patient was Having Pain Previously, Only the Additional Component Attributed to Opiate Withdrawal is Scored 0  -EG         Gooseflesh Skin 0  -EG        Runny Nose or Tearing: Not Accounted for by Cold Symptoms or Allergies 0  -EG        Clinical Opiate Withdrawal Scale Total Score 9  -EG        Heart Rate Source --        Patient Position - Orthostatic VS --                  User Key  (r) = Recorded By, (t) = Taken By, (c) = Cosigned By      Initials Name    KERRY Holman RN                  No data recorded                            History of Present Illness       Chief Complaint   Patient presents with    Medication Refill     Pt is out of methadone. Last taken yesterday. States he tried to go to the clinic today but it is a holiday. Pt now c/o anxiety, abdominal pain, and agitation.        Past Medical History[1]   Past Surgical History[2]   Family History[3]   Social History[4]   E-Cigarette/Vaping      E-Cigarette/Vaping Substances      I have reviewed and agree with the history as documented.     38 yo M h/o methadone dependence presenting for methadone dose.    Methadone clinic closed due to holiday today.   States he picks up weekly every Thursday and when he arrived today it was closed.   C/o feeling uneasy/anxious, feels all sx due to being late for dose.  Denies other new concerns/sx                Review of Systems        Objective       ED Triage Vitals [06/19/25 0634]   Temperature Pulse Blood Pressure Respirations SpO2 Patient Position - Orthostatic VS   97.9 °F (36.6 °C) 97 158/78 18 98 % Sitting      Temp Source Heart Rate Source BP Location FiO2 (%) Pain Score    Oral Monitor Right arm -- --      Vitals      Date and Time Temp Pulse SpO2 Resp BP Pain Score FACES Pain Rating User   06/19/25 0652 -- 97 -- -- -- -- -- EG   06/19/25 0634 97.9 °F (36.6 °C) 97 98 % 18 158/78 -- -- MM            Physical Exam  Vitals and nursing note reviewed.   Constitutional:       General: He is not in acute distress.     Appearance: Normal appearance. He is well-developed.   HENT:      Head: Normocephalic and atraumatic.      Nose:  Nose normal.     Eyes:      Extraocular Movements: Extraocular movements intact.      Conjunctiva/sclera: Conjunctivae normal.       Cardiovascular:      Rate and Rhythm: Normal rate and regular rhythm.      Heart sounds: Normal heart sounds.   Pulmonary:      Effort: Pulmonary effort is normal. No respiratory distress.      Breath sounds: Normal breath sounds. No stridor. No wheezing or rales.   Chest:      Chest wall: No tenderness.   Abdominal:      General: There is no distension.      Palpations: Abdomen is soft.      Tenderness: There is no abdominal tenderness. There is no guarding or rebound.     Musculoskeletal:         General: No swelling, tenderness or deformity.      Cervical back: Normal range of motion and neck supple.     Skin:     General: Skin is warm and dry.      Findings: No rash.     Neurological:      General: No focal deficit present.      Mental Status: He is alert and oriented to person, place, and time.      Motor: No abnormal muscle tone.      Coordination: Coordination normal.     Psychiatric:         Thought Content: Thought content normal.         Judgment: Judgment normal.         Results Reviewed       None            No orders to display       Procedures    ED Medication and Procedure Management   Prior to Admission Medications   Prescriptions Last Dose Informant Patient Reported? Taking?   Cholecalciferol (VITAMIN D3) 1.25 MG (30969 UT) CAPS   No No   Sig: Take 1 capsule (50,000 Units total) by mouth once a week for 8 doses   Lidocaine Viscous HCl (XYLOCAINE) 2 % mucosal solution   No No   Sig: Swish and spit 15 mL 4 (four) times a day as needed for mouth pain or discomfort   acetaminophen (TYLENOL) 500 mg tablet   No No   Sig: Take 1 tablet (500 mg total) by mouth every 6 (six) hours as needed for mild pain, moderate pain, headaches or fever   Patient not taking: Reported on 9/1/2023   acetaminophen (TYLENOL) 500 mg tablet   No No   Sig: Take 1 tablet (500 mg total) by mouth every  6 (six) hours as needed for mild pain or moderate pain   aluminum-magnesium hydroxide-simethicone (MAALOX) 200-200-20 MG/5ML SUSP   No No   Sig: Take 30 mL by mouth 4 (four) times a day as needed for heartburn, indigestion or cramping   cyclobenzaprine (FLEXERIL) 10 mg tablet   No No   Sig: Take 1 tablet (10 mg total) by mouth 3 (three) times a day as needed for muscle spasms   Patient not taking: Reported on 9/1/2023   diclofenac sodium (VOLTAREN) 50 mg EC tablet   No No   Sig: Take 1 tablet (50 mg total) by mouth 3 (three) times a day   Patient not taking: Reported on 9/1/2023   famotidine (PEPCID) 20 mg tablet   No No   Sig: Take 1 tablet (20 mg total) by mouth 2 (two) times a day as needed for heartburn or indigestion   ibuprofen (MOTRIN) 400 mg tablet   No No   Sig: Take 1 tablet (400 mg total) by mouth every 6 (six) hours as needed for mild pain, moderate pain or fever   Patient not taking: Reported on 9/1/2023   ibuprofen (MOTRIN) 800 mg tablet   No No   Sig: Take 1 tablet (800 mg total) by mouth every 8 (eight) hours as needed for moderate pain   Patient not taking: Reported on 9/1/2023   naproxen (Naprosyn) 500 mg tablet   No No   Sig: Take 1 tablet (500 mg total) by mouth 2 (two) times a day with meals   ondansetron (ZOFRAN) 4 mg tablet   No No   Sig: Take 1 tablet (4 mg total) by mouth every 6 (six) hours      Facility-Administered Medications: None     Discharge Medication List as of 6/19/2025  7:35 AM        CONTINUE these medications which have NOT CHANGED    Details   !! acetaminophen (TYLENOL) 500 mg tablet Take 1 tablet (500 mg total) by mouth every 6 (six) hours as needed for mild pain, moderate pain, headaches or fever, Starting Sat 2/9/2019, Print      !! acetaminophen (TYLENOL) 500 mg tablet Take 1 tablet (500 mg total) by mouth every 6 (six) hours as needed for mild pain or moderate pain, Starting Fri 9/1/2023, Normal      aluminum-magnesium hydroxide-simethicone (MAALOX) 200-200-20 MG/5ML SUSP  Take 30 mL by mouth 4 (four) times a day as needed for heartburn, indigestion or cramping, Starting Tue 2/11/2025, Normal      Cholecalciferol (VITAMIN D3) 1.25 MG (92570 UT) CAPS Take 1 capsule (50,000 Units total) by mouth once a week for 8 doses, Starting Thu 1/23/2020, Until Wed 3/18/2020, Normal      cyclobenzaprine (FLEXERIL) 10 mg tablet Take 1 tablet (10 mg total) by mouth 3 (three) times a day as needed for muscle spasms, Starting Wed 3/18/2020, Normal      diclofenac sodium (VOLTAREN) 50 mg EC tablet Take 1 tablet (50 mg total) by mouth 3 (three) times a day, Starting Wed 3/18/2020, Normal      famotidine (PEPCID) 20 mg tablet Take 1 tablet (20 mg total) by mouth 2 (two) times a day as needed for heartburn or indigestion, Starting Tue 2/11/2025, Normal      !! ibuprofen (MOTRIN) 400 mg tablet Take 1 tablet (400 mg total) by mouth every 6 (six) hours as needed for mild pain, moderate pain or fever, Starting Sat 2/9/2019, Print      !! ibuprofen (MOTRIN) 800 mg tablet Take 1 tablet (800 mg total) by mouth every 8 (eight) hours as needed for moderate pain, Starting Mon 1/25/2021, Print      Lidocaine Viscous HCl (XYLOCAINE) 2 % mucosal solution Swish and spit 15 mL 4 (four) times a day as needed for mouth pain or discomfort, Starting Fri 9/1/2023, Normal      naproxen (Naprosyn) 500 mg tablet Take 1 tablet (500 mg total) by mouth 2 (two) times a day with meals, Starting Fri 9/1/2023, Normal      ondansetron (ZOFRAN) 4 mg tablet Take 1 tablet (4 mg total) by mouth every 6 (six) hours, Starting Tue 2/11/2025, Normal       !! - Potential duplicate medications found. Please discuss with provider.        No discharge procedures on file.  ED SEPSIS DOCUMENTATION   Time reflects when diagnosis was documented in both MDM as applicable and the Disposition within this note       Time User Action Codes Description Comment    6/19/2025  7:00 AM Dayanara Bo Add [F11.20] Methadone dependence (HCC)                    [1]    Past Medical History:  Diagnosis Date    Arthritis     Carpal tunnel syndrome     Chronic back pain    [2]   Past Surgical History:  Procedure Laterality Date    CARPAL TUNNEL RELEASE     [3] No family history on file.  [4]   Social History  Tobacco Use    Smoking status: Every Day     Current packs/day: 0.50     Types: Cigarettes    Smokeless tobacco: Never   Substance Use Topics    Alcohol use: No    Drug use: No        Dayanara Bo DO  06/19/25 0765